# Patient Record
Sex: FEMALE | Race: WHITE | NOT HISPANIC OR LATINO | Employment: FULL TIME | URBAN - METROPOLITAN AREA
[De-identification: names, ages, dates, MRNs, and addresses within clinical notes are randomized per-mention and may not be internally consistent; named-entity substitution may affect disease eponyms.]

---

## 2017-01-31 DIAGNOSIS — G89.29 CHRONIC MIDLINE LOW BACK PAIN WITH BILATERAL SCIATICA: ICD-10-CM

## 2017-01-31 DIAGNOSIS — M54.42 CHRONIC MIDLINE LOW BACK PAIN WITH BILATERAL SCIATICA: ICD-10-CM

## 2017-01-31 DIAGNOSIS — M54.41 CHRONIC MIDLINE LOW BACK PAIN WITH BILATERAL SCIATICA: ICD-10-CM

## 2017-01-31 RX ORDER — CARISOPRODOL 350 MG/1
350 TABLET ORAL 3 TIMES DAILY PRN
Qty: 90 TABLET | Refills: 0 | Status: SHIPPED | OUTPATIENT
Start: 2017-01-31 | End: 2017-03-02 | Stop reason: SDUPTHER

## 2017-01-31 RX ORDER — HYDROCODONE BITARTRATE AND ACETAMINOPHEN 7.5; 325 MG/1; MG/1
1 TABLET ORAL EVERY 6 HOURS PRN
Qty: 120 TABLET | Refills: 0 | Status: SHIPPED | OUTPATIENT
Start: 2017-01-31 | End: 2017-03-02 | Stop reason: SDUPTHER

## 2017-01-31 NOTE — TELEPHONE ENCOUNTER
12/12/16 last office visit  11/30/16 last Rx refill-SOMA  11/30/16 last Rx refill Hydrocodone        Pt is req a refill for carisoprodol (SOMA) 350 MG tablet and hydrocodone-acetaminophen 7.5-325mg (NORCO) 7.5-325 mg.  [facility] pharmacy in [facility].  Pt says she will be moving to [place], [place] in 2 weeks.  Pt would like to know if dr kothari can refer her to a dr vergara.  pls call.

## 2017-03-02 DIAGNOSIS — M54.42 CHRONIC MIDLINE LOW BACK PAIN WITH BILATERAL SCIATICA: ICD-10-CM

## 2017-03-02 DIAGNOSIS — G89.29 CHRONIC MIDLINE LOW BACK PAIN WITH BILATERAL SCIATICA: ICD-10-CM

## 2017-03-02 DIAGNOSIS — M54.41 CHRONIC MIDLINE LOW BACK PAIN WITH BILATERAL SCIATICA: ICD-10-CM

## 2017-03-02 RX ORDER — CARISOPRODOL 350 MG/1
350 TABLET ORAL 3 TIMES DAILY PRN
Qty: 90 TABLET | Refills: 0 | Status: SHIPPED | OUTPATIENT
Start: 2017-03-03 | End: 2018-03-27

## 2017-03-02 RX ORDER — HYDROCODONE BITARTRATE AND ACETAMINOPHEN 7.5; 325 MG/1; MG/1
1 TABLET ORAL EVERY 6 HOURS PRN
Qty: 120 TABLET | Refills: 0 | Status: SHIPPED | OUTPATIENT
Start: 2017-03-02 | End: 2017-03-03 | Stop reason: SDUPTHER

## 2017-03-02 NOTE — TELEPHONE ENCOUNTER
12/12/16 last office visit  01/31/17 last Rx refill      Pt is req a refill for carisoprodol (SOMA) 350 MG tablet and hydrocodone 7.5/325.         [facility]/pharmacy #[id number] - [place], [place] - [address] [facility]   [address]   [facility]note 3[facility] [address]   Phone: [phone] Fax: [phone]

## 2017-03-02 NOTE — TELEPHONE ENCOUNTER
[facility] states that they do not have the medication hydrocodone-acetaminophen 7.5-325mg (NORCO at there pharmacy.thanks           [facility]/pharmacy #[id number] - [place] - [address] [facility]   [address]   [place] [address]   Phone: [phone] Fax: [phone]

## 2017-03-02 NOTE — TELEPHONE ENCOUNTER
12/12/16 last Office visit  01/31/17 last Rx refill    Pt is calling for a refill of carisoprodol (SOMA) 350 MG tablet and hydrocodone-acetaminophen 7.5-325mg (NORCO.thanks         [facility] [phone]

## 2017-03-03 DIAGNOSIS — M54.41 CHRONIC MIDLINE LOW BACK PAIN WITH BILATERAL SCIATICA: ICD-10-CM

## 2017-03-03 DIAGNOSIS — M54.42 CHRONIC MIDLINE LOW BACK PAIN WITH BILATERAL SCIATICA: ICD-10-CM

## 2017-03-03 DIAGNOSIS — G89.29 CHRONIC MIDLINE LOW BACK PAIN WITH BILATERAL SCIATICA: ICD-10-CM

## 2017-03-03 RX ORDER — HYDROCODONE BITARTRATE AND ACETAMINOPHEN 7.5; 325 MG/1; MG/1
1 TABLET ORAL EVERY 6 HOURS PRN
Qty: 120 TABLET | Refills: 0 | Status: SHIPPED | OUTPATIENT
Start: 2017-03-03 | End: 2018-03-27

## 2018-03-27 ENCOUNTER — HOSPITAL ENCOUNTER (OUTPATIENT)
Dept: RADIOLOGY | Facility: HOSPITAL | Age: 53
Discharge: HOME | End: 2018-03-27
Attending: PHYSICAL MEDICINE & REHABILITATION
Payer: COMMERCIAL

## 2018-03-27 ENCOUNTER — OFFICE VISIT (OUTPATIENT)
Dept: PHYSICAL MEDICINE AND REHAB | Facility: CLINIC | Age: 53
End: 2018-03-27
Payer: COMMERCIAL

## 2018-03-27 VITALS
SYSTOLIC BLOOD PRESSURE: 118 MMHG | WEIGHT: 168.56 LBS | BODY MASS INDEX: 24.97 KG/M2 | DIASTOLIC BLOOD PRESSURE: 70 MMHG | HEIGHT: 69 IN | HEART RATE: 88 BPM

## 2018-03-27 DIAGNOSIS — M54.50 CHRONIC MIDLINE LOW BACK PAIN WITHOUT SCIATICA: ICD-10-CM

## 2018-03-27 DIAGNOSIS — M54.50 CHRONIC MIDLINE LOW BACK PAIN WITHOUT SCIATICA: Primary | ICD-10-CM

## 2018-03-27 DIAGNOSIS — M54.41 CHRONIC MIDLINE LOW BACK PAIN WITH BILATERAL SCIATICA: ICD-10-CM

## 2018-03-27 DIAGNOSIS — G89.29 CHRONIC MIDLINE LOW BACK PAIN WITHOUT SCIATICA: ICD-10-CM

## 2018-03-27 DIAGNOSIS — G89.29 CHRONIC MIDLINE LOW BACK PAIN WITH BILATERAL SCIATICA: ICD-10-CM

## 2018-03-27 DIAGNOSIS — G89.29 CHRONIC MIDLINE LOW BACK PAIN WITHOUT SCIATICA: Primary | ICD-10-CM

## 2018-03-27 DIAGNOSIS — M54.42 CHRONIC MIDLINE LOW BACK PAIN WITH BILATERAL SCIATICA: ICD-10-CM

## 2018-03-27 DIAGNOSIS — M51.36 DDD (DEGENERATIVE DISC DISEASE), LUMBAR: ICD-10-CM

## 2018-03-27 PROCEDURE — 72100 X-RAY EXAM L-S SPINE 2/3 VWS: CPT | Mod: TC

## 2018-03-27 PROCEDURE — 99999 PR PBB SHADOW E&M-EST. PATIENT-LVL III: CPT | Mod: PBBFAC,,, | Performed by: PHYSICAL MEDICINE & REHABILITATION

## 2018-03-27 PROCEDURE — 72100 X-RAY EXAM L-S SPINE 2/3 VWS: CPT | Mod: 26,,, | Performed by: RADIOLOGY

## 2018-03-27 PROCEDURE — 99214 OFFICE O/P EST MOD 30 MIN: CPT | Mod: S$GLB,,, | Performed by: PHYSICAL MEDICINE & REHABILITATION

## 2018-03-27 PROCEDURE — 72120 X-RAY BEND ONLY L-S SPINE: CPT | Mod: 26,,, | Performed by: RADIOLOGY

## 2018-03-27 RX ORDER — DICLOFENAC SODIUM 50 MG/1
50 TABLET, DELAYED RELEASE ORAL 3 TIMES DAILY
Qty: 90 TABLET | Refills: 0 | Status: SHIPPED | OUTPATIENT
Start: 2018-03-27 | End: 2018-06-06 | Stop reason: SDUPTHER

## 2018-03-27 RX ORDER — VENLAFAXINE 75 MG/1
75 TABLET ORAL 3 TIMES DAILY
Qty: 90 TABLET | Refills: 2 | Status: SHIPPED | OUTPATIENT
Start: 2018-03-27 | End: 2018-06-05

## 2018-03-27 RX ORDER — HYDROCODONE BITARTRATE AND ACETAMINOPHEN 5; 325 MG/1; MG/1
1 TABLET ORAL 2 TIMES DAILY PRN
Qty: 60 TABLET | Refills: 0 | Status: SHIPPED | OUTPATIENT
Start: 2018-03-27 | End: 2018-06-20

## 2018-03-27 RX ORDER — TIZANIDINE 4 MG/1
4 TABLET ORAL NIGHTLY PRN
Qty: 30 TABLET | Refills: 2 | Status: SHIPPED | OUTPATIENT
Start: 2018-03-27 | End: 2018-06-25 | Stop reason: SDUPTHER

## 2018-03-27 NOTE — PROGRESS NOTES
Subjective:       Patient ID: Roxanne Templeton is a 53 y.o. female.    Chief Complaint: No chief complaint on file.    HPI    HISTORY OF PRESENT ILLNESS:  Mrs. Templeton is a 53-year-old white female who was   followed up in the [facility] Medicine Clinic for chronic low back pain with   lumbar radiculopathy.  Her last visit to the clinic was on 12/12/2016.  She was   maintained on meloxicam, venlafaxine, p.r.n. hydrocodone and p.r.n.   carisoprodol.  The patient was lost to follow up due to moving to [place].    She relocated to [place] and is coming back to the clinic to reestablish   care.    The patient reports that she has been out of her medications for the past year.    She did not have the chance follow up with any pain clinic in [place].  Her   back pain has been slowly getting worse.  It is a constant grabbing pain in the   lumbar spine and across her back.  She used to have shooting pain to both feet   with numbness, but this has not been a problem for the last few months.  Her   back pain is aggravated by bending, lifting and activity in general.  It is   better with rest.  Her maximum pain is 10/10 and minimum 6/10.  Today, it is   8/10.  The patient denies any lower extremity weakness.  She denies any leg   numbness.  She denies any bowel or bladder incontinence.  She denies any saddle   anesthesia.  She does complain of intermittent restless legs.  She has been   taking tizanidine (from her ) with good relief.  She is doing a home   exercise program regularly.      MS/HN  dd: 03/27/2018 14:52:28 (CDT)  td: 03/28/2018 12:36:51 (CDT)  Doc ID   #[id number]  Job ID #[id number]    CC:         Review of Systems   Constitutional: Negative for fatigue.   Eyes: Negative for visual disturbance.   Respiratory: Negative for shortness of breath.    Cardiovascular: Negative for chest pain.   Gastrointestinal: Negative for blood in stool, constipation, nausea and vomiting.   Genitourinary: Negative for  difficulty urinating.   Musculoskeletal: Positive for back pain. Negative for arthralgias, gait problem and neck pain.   Skin: Negative for rash.   Neurological: Positive for dizziness. Negative for headaches.   Psychiatric/Behavioral: Negative for behavioral problems and sleep disturbance.       Objective:      Physical Exam   Constitutional: She appears well-developed and well-nourished. No distress.   Neck: Normal range of motion.   Musculoskeletal:   BUE:  ROM:   RUE: full.   LUE: full.  Strength:    RUE: 5/5 at shoulder abduction, 5 elbow flexion, 5 elbow extension, 5 hand .   LUE: 5/5 at shoulder abduction, 5 elbow flexion, 5 elbow extension, 5 hand .  Sensation to pinprick:   RUE: intact.   LUE: intact.  DTR:    RUE: +2 biceps, +2 triceps.   LUE:  +2 biceps, +2 triceps.  Rodriguez:   RUE: -ve.   LUE: -ve.    BLE:  ROM:   RLE: full.   LLE: full.  Knee crepitus:   RLE: mild.   LLE: mild.   Strength:    RLE: 5/5 at hip flexion, 5 knee extension, 5 ankle DF, 5 PF, 5 EHL.   LLE: 5/5 at hip flexion, 5 knee extension, 5 ankle DF, 5 PF, 5 EHL.  Sensation to pinprick:     RLE: intact.      LLE: intact.   DTR:     RLE: +3 knee, +2 ankle.    LLE: +3 knee, +2 ankle.  Clonus:    Rt ankle: -ve.    Lt ankle: -ve.  SLR (sitting):      RLE: -ve.      LLE: -ve.    Mild tenderness over lumbar spine.    Gait: WNL       Neurological: She is alert.   Psychiatric: She has a normal mood and affect. Her behavior is normal.   Vitals reviewed.        Assessment:       1. Chronic midline low back pain with bilateral sciatica    2. DDD (degenerative disc disease), lumbar        Plan:     - X-Ray Lumbar Spine Ap Lateral w/Flex Ext; Future  - Start diclofenac (VOLTAREN) 50 MG EC tablet; Take 1 tablet (50 mg total) by mouth 3 (three) times daily.  - Restart venlafaxine (EFFEXOR) 75 MG tablet; Take 1 tablet (75 mg total) by mouth 3 (three) times daily.  - Restart/decrease hydrocodone-acetaminophen 5-325mg (NORCO) 5-325 mg per tablet;  Take 1 tablet by mouth 2 (two) times daily as needed.  - Continue tiZANidine (ZANAFLEX) 4 MG tablet; Take 1 tablet (4 mg total) by mouth nightly as needed (muscle spasms).  - Regular home exercise program was encouraged.  - Follow-up in about 3 months (around 6/27/2018).     This was a 25 minute visit, more than 50% of which was spent counseling the patient about the diagnosis and the treatment plan.

## 2018-04-03 ENCOUNTER — TELEPHONE (OUTPATIENT)
Dept: PHYSICAL MEDICINE AND REHAB | Facility: CLINIC | Age: 53
End: 2018-04-03

## 2018-04-03 RX ORDER — TRAMADOL HYDROCHLORIDE 50 MG/1
50 TABLET ORAL 3 TIMES DAILY PRN
Qty: 90 TABLET | Refills: 0 | Status: SHIPPED | OUTPATIENT
Start: 2018-04-03 | End: 2018-05-03

## 2018-04-03 NOTE — TELEPHONE ENCOUNTER
Refill sent to Pharmacy 03/27/18.      ----- Message from Alejandra Robertson sent at 4/3/2018  9:08 AM CDT -----  Contact: pt @ [phone]  Calling to get the status of her refill for medication: hydrocodone-acetaminophen 5-325mg (NORCO) 5-325 mg per tablet, saying that she told the doctors office that she would even take the TRAMADOL if she can get it sooner. Please call.

## 2018-04-04 ENCOUNTER — TELEPHONE (OUTPATIENT)
Dept: PHYSICAL MEDICINE AND REHAB | Facility: CLINIC | Age: 53
End: 2018-04-04

## 2018-04-04 NOTE — TELEPHONE ENCOUNTER
----- Message from Princess Hung MD sent at 4/3/2018  7:34 PM CDT -----  Contact: self @ [phone]  Pls let her know I sent tramadol to [facility].  ----- Message -----  From: Lucille Ceballos MA  Sent: 3/29/2018   9:44 AM  To: Princess Hung MD        ----- Message -----  From: Fiona Gonzales  Sent: 3/29/2018   9:32 AM  To: Abhilash BURGESS Staff    Pt says she has not been able to  her prescription for hydrocodone 5/325 because she will need an auth.  Pt would like to the tramadol instead of the hydrocodone.  Pls send to       [facility]/pharmacy #[id number] - [place], [place] - [address] [facility]  [address]  [facility] [address]  Phone: [phone] Fax: [phone]

## 2018-06-05 ENCOUNTER — OFFICE VISIT (OUTPATIENT)
Dept: INTERNAL MEDICINE | Facility: CLINIC | Age: 53
End: 2018-06-05
Payer: COMMERCIAL

## 2018-06-05 ENCOUNTER — HOSPITAL ENCOUNTER (OUTPATIENT)
Dept: RADIOLOGY | Facility: HOSPITAL | Age: 53
Discharge: HOME | End: 2018-06-05
Attending: PHYSICIAN ASSISTANT
Payer: COMMERCIAL

## 2018-06-05 ENCOUNTER — TELEPHONE (OUTPATIENT)
Dept: INTERNAL MEDICINE | Facility: CLINIC | Age: 53
End: 2018-06-05

## 2018-06-05 VITALS
DIASTOLIC BLOOD PRESSURE: 68 MMHG | HEART RATE: 86 BPM | SYSTOLIC BLOOD PRESSURE: 100 MMHG | HEIGHT: 69 IN | WEIGHT: 156.94 LBS | BODY MASS INDEX: 23.25 KG/M2

## 2018-06-05 DIAGNOSIS — Z00.00 ENCOUNTER FOR PREVENTIVE HEALTH EXAMINATION: Primary | ICD-10-CM

## 2018-06-05 DIAGNOSIS — Z12.11 ENCOUNTER FOR COLORECTAL CANCER SCREENING: ICD-10-CM

## 2018-06-05 DIAGNOSIS — Z12.31 SCREENING MAMMOGRAM, ENCOUNTER FOR: ICD-10-CM

## 2018-06-05 DIAGNOSIS — Z12.12 ENCOUNTER FOR COLORECTAL CANCER SCREENING: ICD-10-CM

## 2018-06-05 DIAGNOSIS — Z72.0 TOBACCO ABUSE: ICD-10-CM

## 2018-06-05 DIAGNOSIS — Z23 NEED FOR TDAP VACCINATION: ICD-10-CM

## 2018-06-05 PROCEDURE — 77067 SCR MAMMO BI INCL CAD: CPT | Mod: 26,,, | Performed by: RADIOLOGY

## 2018-06-05 PROCEDURE — 99396 PREV VISIT EST AGE 40-64: CPT | Mod: S$GLB,,, | Performed by: PHYSICIAN ASSISTANT

## 2018-06-05 PROCEDURE — 77067 SCR MAMMO BI INCL CAD: CPT | Mod: TC

## 2018-06-05 PROCEDURE — 99999 PR PBB SHADOW E&M-EST. PATIENT-LVL V: CPT | Mod: PBBFAC,,, | Performed by: PHYSICIAN ASSISTANT

## 2018-06-05 NOTE — TELEPHONE ENCOUNTER
Spoke with pt advised of results. Pt would like a low cholesterol diet mailed to her address on file. Where do I get this from? Pt is on recall for an appointment with PCP.

## 2018-06-05 NOTE — TELEPHONE ENCOUNTER
Please call patient.      Cholesterol is significantly elevated compared to prior.  Has she been doing anything different with her diet?  She needs to follow a low cholesterol diet and recheck her cholesterol in about 6 months.  Please place her on the recall list for Dr. Wheeler for 6 months and she should get lab work prior.  Okay to mail low cholesterol diet.    The remainder of her lab work is acceptable (thyroid, blood counts, blood sugar, kidney, liver).

## 2018-06-05 NOTE — PATIENT INSTRUCTIONS
Prevention Guidelines, Women Ages 50 to 64  Screening tests and vaccines are an important part of managing your health. Health counseling is essential, too. Below are guidelines for these, for women ages 50 to 64. Talk with your healthcare provider to make sure youre up to date on what you need.  Screening Who needs it How often   Type 2 diabetes or prediabetes All adults beginning at age 45 and adults without symptoms at any age who are overweight or obese and have 1 or more additional risk factors for diabetes. At  least every 3 years   Alcohol misuse All women in this age group At routine exams   Blood pressure All women in this age group Every 2 years if your blood pressure is less than 120/80 mm Hg; yearly if your systolic blood pressure is 120 to 139 mm Hg, or your diastolic blood pressure reading is 80 to 89 mm Hg   Breast cancer All women in this age group Yearly mammogram and clinical breast exam1   Cervical cancer All women in this age group, except women who have had a complete hysterectomy Pap test every 3 years or Pap test with human papillomavirus (HPV) test every 5 years   Chlamydia Women at increased risk for infection At routine exams   Colorectal cancer All women in this age group Flexible sigmoidoscopy every 5 years, or colonoscopy every 10 years, or double-contrast barium enema every 5 years; yearly fecal occult blood test or fecal immunochemical test; or a stool DNA test as often as your health care provider advises; talk with your health care provider about which tests are best for you   Depression All women in this age group At routine exams   Gonorrhea Sexually active women at increased risk for infection At routine exams   Hepatitis C Anyone at increased risk; 1 time for those born between 1945 and 1965 At routine exams   High cholesterol or triglycerides All women in this age group who are at risk for coronary artery disease At least every 5 years   HIV All women At routine exams   Lung  cancer Adults age 55 to 80 who have smoked Yearly screening in smokers with 30 pack-year history of smoking or who quit within 15 years   Obesity All women in this age group At routine exams   Osteoporosis Women who are postmenopausal Ask your healthcare provider   Syphilis Women at increased risk for infection - talk with your healthcare provider At routine exams   Tuberculosis Women at increased risk for infection - talk with your healthcare provider Ask your healthcare provider   Vision All women in this age group Ask your healthcare provider   Vaccine Who needs it How often   Chickenpox (varicella) All women in this age group who have no record of this infection or vaccine 2 doses; the second dose should be given at least 4 weeks after the first dose   Hepatitis A Women at increased risk for infection - talk with your healthcare provider 2 doses given at least 6 months apart   Hepatitis B Women at increased risk for infection - talk with your healthcare provider 3 doses over 6 months; second dose should be given 1 month after the first dose; the third dose should be given at least 2 months after the second dose and at least 4 months after the first dose   Haemophilus influenzaeType B (HIB) Women at increased risk for infection - talk with your healthcare provider 1 to 3 doses   Influenza (flu) All women in this age group Once a year   Measles, mumps, rubella (MMR) Women in this age group through their late 50s who have no record of these infections or vaccines 1 dose   Meningococcal Women at increased risk for infection - talk with your healthcare provider 1 or more doses   Pneumococcal conjugate vaccine (PCV13) and pneumococcal polysaccharide vaccine (PPSV23) Women at increased risk for infection - talk with your healthcare provider PCV13: 1 dose ages 19 to 65 (protects against 13 types of pneumococcal bacteria)  PPSV23: 1 to 2 doses through age 64, or 1 dose at 65 or older (protects against 23 types of  pneumococcal bacteria)   Tetanus/diphtheria/pertussis (Td/Tdap) booster All women in this age group Td every 10 years, or a one-time dose of Tdap instead of a Td booster after age 18, then Td every 10 years   Zoster All women ages 60 and older 1 dose   Counseling Who needs it How often   BRCA gene mutation testing for breast and ovarian cancer susceptibility Women with increased risk for having gene mutation When your risk is known   Breast cancer and chemoprevention Women at high risk for breast cancer When your risk is known   Diet and exercise Women who are overweight or obese When diagnosed, and then at routine exams   Sexually transmitted infection prevention Women at increased risk for infection - talk with your healthcare provider At routine exams   Use of daily aspirin Women ages 55 and up in this age group who are at risk for cardiovascular health problems such as stroke When your risk is known   Use of tobacco and the health effects it can cause All women in this age group Every exam   1American [employer] Society  Date Last Reviewed: 1/26/2016  © 9166-8816 [employer]. [address] [place] [address], [place] [address]. All rights reserved. This information is not intended as a substitute for professional medical care. Always follow your healthcare professional's instructions.

## 2018-06-05 NOTE — PROGRESS NOTES
Subjective:       Patient ID: Roxanne Templeton is a 53 y.o. female.        Chief Complaint: Annual Exam    Roxanne Templeton is an established patient of Mere Wheeler MD here today for annual exam.    She stopped smoking 5 weeks ago!  Congratulated on this accomplishment.      With chronic back pain that is followed by Dr. Hung.    HEALTH MAINTENANCE  Pneumonia vaccine: declines  Tdap: rx given  Colonoscopy 2013 f/u due in 3 years: declines further colonoscopy secondary to bad experience, Fitkit given today  Mammogram: due, ordered  GYN exam: due, ordered  Lab work: due, ordered  CT scan lungs for screening for lung cancer: discussed and patient declined           Review of Systems   Constitutional: Negative for chills, diaphoresis, fatigue and fever.   HENT: Negative for congestion and sore throat.    Eyes: Negative for visual disturbance.   Respiratory: Negative for cough, chest tightness and shortness of breath.    Cardiovascular: Negative for chest pain, palpitations and leg swelling.   Gastrointestinal: Negative for abdominal pain, blood in stool, constipation, diarrhea, nausea and vomiting.   Genitourinary: Negative for dysuria, frequency, hematuria and urgency.   Musculoskeletal: Positive for back pain (chronic). Negative for arthralgias.   Skin: Negative for rash.   Neurological: Negative for dizziness, syncope, weakness and headaches.   Psychiatric/Behavioral: Negative for dysphoric mood and sleep disturbance. The patient is not nervous/anxious.        Objective:      Physical Exam   Constitutional: She appears well-developed and well-nourished. No distress.   HENT:   Head: Normocephalic and atraumatic.   Right Ear: Tympanic membrane and external ear normal.   Left Ear: Tympanic membrane and external ear normal.   Nose: Nose normal.   Mouth/Throat: Oropharynx is clear and moist.   Eyes: Conjunctivae are normal. Pupils are equal, round, and reactive to light.   Cardiovascular: Normal rate,  "regular rhythm and normal heart sounds.  Exam reveals no gallop.    No murmur heard.  Pulmonary/Chest: Effort normal and breath sounds normal. No respiratory distress.   Abdominal: Soft. Normal appearance. There is no tenderness. There is no rebound, no guarding and no CVA tenderness.   Musculoskeletal: She exhibits no edema.   Neurological: She is alert.   Skin: Skin is warm and dry. She is not diaphoretic.   Psychiatric: She has a normal mood and affect.   Nursing note and vitals reviewed.      Assessment:       1. Encounter for preventive health examination    2. Encounter for colorectal cancer screening    3. Screening mammogram, encounter for    4. Need for Tdap vaccination    5. Tobacco abuse        Plan:       Roxanne was seen today for annual exam.    Diagnoses and all orders for this visit:    Encounter for preventive health examination  -     CBC auto differential; Future  -     Comprehensive metabolic panel; Future  -     Lipid panel; Future  -     TSH; Future  -     Ambulatory referral to Gynecology    Encounter for colorectal cancer screening  -     Fecal Immunochemical Test (iFOBT); Future    Screening mammogram, encounter for  -     Mammo Digital Screening Bilat with CAD; Future    Need for Tdap vaccination  -     Tdap Vaccine    Tobacco abuse: she quit smoking 5 weeks ago!    F/u with Dr. Wheeler in 1 year for annual exam.    Pt has been given instructions populated from [employer] database and has verbalized understanding of the after visit summary and information contained wherein.    Follow up with a primary care provider. May go to ER for acute shortness of breath, lightheadedness, fever, or any other emergent complaints or changes in condition.    "This note will be shared with the patient"    Future Appointments  Date Time Provider Department Center   6/20/2018 10:45 AM Jeannette De La Paz MD [facility] [address]   6/28/2018 1:40 PM Princess Hung MD [facility] PHYSJOSE MIGUEL [address]               "

## 2018-06-06 DIAGNOSIS — M54.50 CHRONIC MIDLINE LOW BACK PAIN WITHOUT SCIATICA: ICD-10-CM

## 2018-06-06 DIAGNOSIS — G89.29 CHRONIC MIDLINE LOW BACK PAIN WITHOUT SCIATICA: ICD-10-CM

## 2018-06-06 RX ORDER — DICLOFENAC SODIUM 50 MG/1
50 TABLET, DELAYED RELEASE ORAL 3 TIMES DAILY
Qty: 90 TABLET | Refills: 0 | Status: SHIPPED | OUTPATIENT
Start: 2018-06-06 | End: 2018-06-28 | Stop reason: SDUPTHER

## 2018-06-07 ENCOUNTER — LAB VISIT (OUTPATIENT)
Dept: LAB | Facility: HOSPITAL | Age: 53
End: 2018-06-07
Attending: PHYSICIAN ASSISTANT
Payer: COMMERCIAL

## 2018-06-07 DIAGNOSIS — Z12.11 ENCOUNTER FOR COLORECTAL CANCER SCREENING: ICD-10-CM

## 2018-06-07 DIAGNOSIS — Z12.12 ENCOUNTER FOR COLORECTAL CANCER SCREENING: ICD-10-CM

## 2018-06-07 LAB — HEMOCCULT STL QL IA: NEGATIVE

## 2018-06-07 PROCEDURE — 82274 ASSAY TEST FOR BLOOD FECAL: CPT

## 2018-06-20 ENCOUNTER — OFFICE VISIT (OUTPATIENT)
Dept: OBSTETRICS AND GYNECOLOGY | Facility: CLINIC | Age: 53
End: 2018-06-20
Payer: COMMERCIAL

## 2018-06-20 VITALS
SYSTOLIC BLOOD PRESSURE: 114 MMHG | HEIGHT: 69 IN | BODY MASS INDEX: 22.76 KG/M2 | DIASTOLIC BLOOD PRESSURE: 80 MMHG | WEIGHT: 153.69 LBS

## 2018-06-20 DIAGNOSIS — Z01.419 ENCOUNTER FOR GYNECOLOGICAL EXAMINATION WITHOUT ABNORMAL FINDING: Primary | ICD-10-CM

## 2018-06-20 DIAGNOSIS — Z12.31 SCREENING MAMMOGRAM, ENCOUNTER FOR: ICD-10-CM

## 2018-06-20 PROCEDURE — 99386 PREV VISIT NEW AGE 40-64: CPT | Mod: S$GLB,,, | Performed by: OBSTETRICS & GYNECOLOGY

## 2018-06-20 PROCEDURE — 99999 PR PBB SHADOW E&M-EST. PATIENT-LVL III: CPT | Mod: PBBFAC,,, | Performed by: OBSTETRICS & GYNECOLOGY

## 2018-06-20 RX ORDER — VENLAFAXINE 75 MG/1
TABLET ORAL
COMMUNITY
Start: 2018-06-08 | End: 2018-06-28

## 2018-06-20 NOTE — LETTER
June 20, 2018      Magi Avila PA-C  [address]  [facility] [address]           [facility] OB/GYN 5th Floor  [address]  [facility] [address]  Phone: [phone]          Patient: Roxanne Templeton   MR Number: [id number]   YOB: 1965   Date of Visit: 6/20/2018       Dear Magi Avila:    Thank you for referring Roxanne Templeton to me for evaluation. Attached you will find relevant portions of my assessment and plan of care.    If you have questions, please do not hesitate to call me. I look forward to following Roxanne Templeton along with you.    Sincerely,    Jeannette De La Paz MD    Enclosure  CC:  No Recipients    If you would like to receive this communication electronically, please contact [email or web address] or [phone] to request more information on [employer] access.    For providers and/or their staff who would like to refer a patient to [facility], please contact us through our one-stop-shop provider referral line, [facility], at [phone].    If you feel you have received this communication in error or would no longer like to receive these types of communications, please e-mail [email or web address]

## 2018-06-20 NOTE — PROGRESS NOTES
"  Subjective:       Patient ID: Roxanne Templeton is a 53 y.o. female.    Chief Complaint:  Annual Exam      History of Present Illness  HPI  Roxanne Templeton is a 53 y.o. female  NEW TO ME  here for her annual GYN exam.     denies vaginal itching or irritation.  Denies vaginal discharge.  She is sexually active. She has had 1 partner for 25 years .   History of abnormal pap: No  Last Pap: was normal  Last MMG: normal--routine follow-up in 12 months  Last Colonoscopy:  colonoscopy 1 month ago without abnormalities.  denies domestic violence. She does feel safe at home.     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      [facility]      Social History Main Topics    Smoking status: Current Every Day Smoker     Packs/day: 0.50     Years: 30.00     Start date: 1981    Smokeless tobacco: Never Used    Alcohol use No    Drug use: No    Sexual activity: Yes     Partners: Male      Comment: , together x 23 years, since      Other Topics Concern    Not on file     Social History Narrative    No narrative on file     Family History   Problem Relation Age of Onset    Colon polyps Mother     Diabetes Neg Hx     Heart disease Neg Hx     Breast cancer Neg Hx     Hypertension Neg Hx     Ovarian cancer Neg Hx      OB History      Para Term  AB Living    2 2 2     2    SAB TAB Ectopic Multiple Live Births            2          /80   Ht 5' 9" (1.753 m)   Wt 69.7 kg (153 lb 11.2 oz)   BMI 22.70 kg/m²         GYN & OB History    Date of Last " Pap: No result found    OB History    Para Term  AB Living   2 2 2     2   SAB TAB Ectopic Multiple Live Births           2      # Outcome Date GA Lbr Francisco/2nd Weight Sex Delivery Anes PTL Lv   2 Term      Vag-Spont   JOHNNIE   1 Term      Vag-Spont   JOHNNIE          Review of Systems  Review of Systems   Constitutional: Negative for activity change, appetite change, fatigue and unexpected weight change.   HENT: Negative.    Eyes: Negative for visual disturbance.   Respiratory: Negative for shortness of breath and wheezing.    Cardiovascular: Negative for chest pain, palpitations and leg swelling.   Gastrointestinal: Negative for abdominal pain, bloating and blood in stool.   Endocrine: Positive for hot flashes. Negative for diabetes and hair loss.   Genitourinary: Negative for decreased libido and dyspareunia.   Musculoskeletal: Negative for back pain and joint swelling.   Skin:  Negative for no acne and hair changes.   Neurological: Negative for headaches.   Hematological: Does not bruise/bleed easily.   Psychiatric/Behavioral: Negative for depression and sleep disturbance. The patient is not nervous/anxious.    Breast: Negative for breast pain and nipple discharge          Objective:    Physical Exam:   Constitutional: She is oriented to person, place, and time. She appears well-developed and well-nourished.    HENT:   Head: Normocephalic and atraumatic.    Eyes: EOM are normal. Pupils are equal, round, and reactive to light.    Neck: Normal range of motion. Neck supple.    Cardiovascular: Normal rate and regular rhythm.     Pulmonary/Chest: Effort normal and breath sounds normal.   BREASTS:  no mass, no tenderness, no deformity and no retraction. Right breast exhibits no inverted nipple, no mass, no nipple discharge, no skin change, no tenderness, no bleeding and no swelling. Left breast exhibits no inverted nipple, no mass, no nipple discharge, no skin change, no tenderness, no bleeding and no swelling.  Breasts are symmetrical.              Abdominal: Soft. Bowel sounds are normal.     Genitourinary: Pelvic exam was performed with patient supine.   Genitourinary Comments: PELVIC: Normal external female genitalia without lesions. Normal hair distribution. Adequate perineal body, normal urethral meatus. Vagina atrophic without lesions or discharge. No significant cystocele or rectocele. Bimanual exam shows uterus and cervix to be surgically absent. Adnexa without masses or tenderness.  RECTAL: Deferred             Musculoskeletal: Normal range of motion and moves all extremeties.       Neurological: She is alert and oriented to person, place, and time.    Skin: Skin is warm and dry.    Psychiatric: She has a normal mood and affect.          Assessment:        1. Encounter for gynecological examination without abnormal finding    2. Screening mammogram, encounter for               Plan:      1. Encounter for gynecological examination without abnormal finding  COUNSELING:  The patient was counseled today on osteoporosis prevention, calcium supplementation, and regular weight bearing exercise. The patient was also counseled today on ACS PAP guidelines, with recommendations for yearly pelvic exams unless their uterus, cervix, and ovaries were removed for benign reasons; in that case, examinations every 3-5 years are recommended. The patient was also counseled regarding monthly breast self-examination, routine STD screening for at-risk populations, prophylactic immunizations for transmitted infections such as HPV, Pertussis, or Influenza as appropriate, and yearly mammograms when indicated by ACS guidelines. She was advised to see her primary care physician for all other health maintenance.   FOLLOW-UP with me for next routine visit.         2. Screening mammogram, encounter for    - Mammo Digital Screening Bilat with Tomosynthesis CAD; Future       Follow-up in about 1 year (around 6/20/2019).

## 2018-06-25 RX ORDER — TIZANIDINE 4 MG/1
4 TABLET ORAL NIGHTLY PRN
Qty: 30 TABLET | Refills: 2 | Status: SHIPPED | OUTPATIENT
Start: 2018-06-25 | End: 2018-08-13 | Stop reason: SDUPTHER

## 2018-06-28 ENCOUNTER — OFFICE VISIT (OUTPATIENT)
Dept: PHYSICAL MEDICINE AND REHAB | Facility: CLINIC | Age: 53
End: 2018-06-28
Payer: COMMERCIAL

## 2018-06-28 ENCOUNTER — TELEPHONE (OUTPATIENT)
Dept: PHYSICAL MEDICINE AND REHAB | Facility: CLINIC | Age: 53
End: 2018-06-28

## 2018-06-28 VITALS
BODY MASS INDEX: 22.9 KG/M2 | SYSTOLIC BLOOD PRESSURE: 103 MMHG | HEART RATE: 82 BPM | WEIGHT: 154.63 LBS | DIASTOLIC BLOOD PRESSURE: 65 MMHG | HEIGHT: 69 IN

## 2018-06-28 DIAGNOSIS — M51.36 DDD (DEGENERATIVE DISC DISEASE), LUMBAR: ICD-10-CM

## 2018-06-28 DIAGNOSIS — M54.50 CHRONIC MIDLINE LOW BACK PAIN WITHOUT SCIATICA: Primary | ICD-10-CM

## 2018-06-28 DIAGNOSIS — G89.29 CHRONIC MIDLINE LOW BACK PAIN WITHOUT SCIATICA: Primary | ICD-10-CM

## 2018-06-28 DIAGNOSIS — G89.29 CHRONIC MIDLINE LOW BACK PAIN WITHOUT SCIATICA: ICD-10-CM

## 2018-06-28 DIAGNOSIS — M54.50 CHRONIC MIDLINE LOW BACK PAIN WITHOUT SCIATICA: ICD-10-CM

## 2018-06-28 PROCEDURE — 99999 PR PBB SHADOW E&M-EST. PATIENT-LVL III: CPT | Mod: PBBFAC,,, | Performed by: PHYSICAL MEDICINE & REHABILITATION

## 2018-06-28 PROCEDURE — 99213 OFFICE O/P EST LOW 20 MIN: CPT | Mod: S$GLB,,, | Performed by: PHYSICAL MEDICINE & REHABILITATION

## 2018-06-28 PROCEDURE — 3008F BODY MASS INDEX DOCD: CPT | Mod: CPTII,S$GLB,, | Performed by: PHYSICAL MEDICINE & REHABILITATION

## 2018-06-28 RX ORDER — HYDROCODONE BITARTRATE AND ACETAMINOPHEN 5; 325 MG/1; MG/1
1 TABLET ORAL 3 TIMES DAILY PRN
Qty: 90 TABLET | Refills: 0 | Status: SHIPPED | OUTPATIENT
Start: 2018-06-28 | End: 2018-07-10 | Stop reason: SDUPTHER

## 2018-06-28 RX ORDER — VENLAFAXINE HYDROCHLORIDE 37.5 MG/1
37.5 CAPSULE, EXTENDED RELEASE ORAL DAILY
Qty: 30 CAPSULE | Refills: 3 | Status: SHIPPED | OUTPATIENT
Start: 2018-06-28 | End: 2018-10-29

## 2018-06-28 RX ORDER — DICLOFENAC SODIUM 75 MG/1
75 TABLET, DELAYED RELEASE ORAL 2 TIMES DAILY
Qty: 60 TABLET | Refills: 2 | Status: SHIPPED | OUTPATIENT
Start: 2018-06-28 | End: 2018-08-15 | Stop reason: SDUPTHER

## 2018-06-28 RX ORDER — VENLAFAXINE HYDROCHLORIDE 150 MG/1
150 CAPSULE, EXTENDED RELEASE ORAL DAILY
Qty: 30 CAPSULE | Refills: 3 | Status: SHIPPED | OUTPATIENT
Start: 2018-06-28 | End: 2018-10-27 | Stop reason: SDUPTHER

## 2018-06-28 RX ORDER — HYDROCODONE BITARTRATE AND ACETAMINOPHEN 5; 325 MG/1; MG/1
1 TABLET ORAL 3 TIMES DAILY PRN
Qty: 90 TABLET | Refills: 0 | Status: SHIPPED | OUTPATIENT
Start: 2018-06-28 | End: 2018-06-28 | Stop reason: SDUPTHER

## 2018-06-28 NOTE — TELEPHONE ENCOUNTER
----- Message from Vasyl Patterson sent at 6/28/2018  2:02 PM CDT -----  Pharmacy Calling    Reason for call: [facility] states insurance will only fill 5 days for first fill of medication (which they will have to dispense it that way), and [facility] is asking the doctor send another script for 30 days. Pls fax to   [facility]arm[facility]: [facility]   Prescription Name: HYDROcodone-acetaminophen (NORCO) 5-325 mg per tablet  Phone Number: [phone]  Additional Information:

## 2018-06-28 NOTE — PROGRESS NOTES
Subjective:       Patient ID: Roxanne Templeton is a 53 y.o. female.    Chief Complaint: No chief complaint on file.    HPI    Ms. Templeton is a 53-year-old white female who has [facility] for chronic low back pain with history of lumbar radiculopathy.    Her last visit to the clinic was on 03/27/2018.  She was started on diclofenac,   venlafaxine, p.r.n. tizanidine and p.r.n. tramadol.    The patient comes today to the clinic for followup.  Her back pain has been   stable.  It is a constant grabbing and aching pain in the lumbar spine and   across her back.  She denies any significant radiation to lower extremities.    Her pain is worse with activity.  Her maximum pain is 9-10/10 and minimum   5-6/10.  Today, it is 5-6/10.  The patient denies any lower extremity weakness   or numbness.  She denies any bowel or bladder incontinence.    She is currently taking diclofenac 50 mg p.o. twice per day.  She is not able to   take the midday dose.  She take venlafaxine 75 mg p.o. twice per day instead of   3 times per day as prescribed.  She takes tizanidine 4 mg p.r.n., usually once   at bedtime.  She has been taking tramadol, but stopped it secondary to lack of   relief.      MS/IN  dd: 06/28/2018 13:58:27 (CDT)  td: 06/29/2018 05:19:07 (CDT)  Doc ID   #[id number]  Job ID #[id number]    CC:         Review of Systems   Constitutional: Negative for fatigue.   Eyes: Negative for visual disturbance.   Respiratory: Negative for shortness of breath.    Cardiovascular: Negative for chest pain.   Gastrointestinal: Negative for blood in stool, constipation, nausea and vomiting.   Genitourinary: Negative for difficulty urinating.   Musculoskeletal: Positive for back pain. Negative for arthralgias, gait problem and neck pain.   Neurological: Positive for dizziness. Negative for headaches.   Psychiatric/Behavioral: Negative for behavioral problems and sleep disturbance.       Objective:      Physical Exam    Constitutional: She appears well-developed and well-nourished. No distress.   Neck: Normal range of motion.   Musculoskeletal:   BLE:  ROM:   RLE: full.   LLE: full.  Knee crepitus:   RLE: mild.   LLE: mild.   Strength:    RLE: 5/5 at hip flexion, 5 knee extension, 5 ankle DF, 5 PF, 5 EHL.   LLE: 5/5 at hip flexion, 5 knee extension, 5 ankle DF, 5 PF, 5 EHL.  Sensation to pinprick:     RLE: intact.      LLE: intact.   DTR:     RLE: +3 knee, +2 ankle.    LLE: +3 knee, +2 ankle.  Clonus:    Rt ankle: -ve.    Lt ankle: -ve.  SLR (sitting):      RLE: -ve.      LLE: -ve.    Mild tenderness over lumbar spine.    Gait: WNL       Neurological: She is alert.   Psychiatric: She has a normal mood and affect. Her behavior is normal.   Vitals reviewed.        IMAGING STUDIES:      XR LUMBAR SPINE AP AND LAT WITH FLEX/EXT (3/27/18):    CLINICAL HISTORY:  Low back pain    FINDINGS:  No fracture dislocation bone destruction seen.  There is mild DJD.  No instability is seen.    Impression   See above          Assessment:       1. Chronic midline low back pain without sciatica    2. DDD (degenerative disc disease), lumbar        Plan:     - Start diclofenac (VOLTAREN) 75 MG EC tablet; Take 1 tablet (75 mg total) by mouth 2 (two) times daily.  - Start venlafaxine (EFFEXOR-XR) 150 MG Cp24; Take 1 capsule (150 mg total) by mouth once daily.  - Start venlafaxine (EFFEXOR-XR) 37.5 MG 24 hr capsule; Take 1 capsule (37.5 mg total) by mouth once daily. Take with the 150 mg capsule  - Start HYDROcodone-acetaminophen (NORCO) 5-325 mg per tablet; Take 1 tablet by mouth 3 (three) times daily as needed.  - Continue tiZANidine (ZANAFLEX) 4 MG tablet; Take 1 tablet (4 mg total) by mouth nightly as needed (muscle spasms).  - Regular home exercise program was encouraged.  - Follow-up in about 3 months (around 9/28/2018).

## 2018-07-05 ENCOUNTER — TELEPHONE (OUTPATIENT)
Dept: PHYSICAL MEDICINE AND REHAB | Facility: CLINIC | Age: 53
End: 2018-07-05

## 2018-07-05 DIAGNOSIS — G89.29 CHRONIC MIDLINE LOW BACK PAIN WITHOUT SCIATICA: ICD-10-CM

## 2018-07-05 DIAGNOSIS — M54.50 CHRONIC MIDLINE LOW BACK PAIN WITHOUT SCIATICA: ICD-10-CM

## 2018-07-05 RX ORDER — HYDROCODONE BITARTRATE AND ACETAMINOPHEN 5; 325 MG/1; MG/1
1 TABLET ORAL 3 TIMES DAILY PRN
Qty: 90 TABLET | Refills: 0 | Status: CANCELLED | OUTPATIENT
Start: 2018-07-05 | End: 2018-08-04

## 2018-07-05 NOTE — TELEPHONE ENCOUNTER
Medication expires 07/28/18        ----- Message from Alejandra Robertson sent at 7/5/2018  9:55 AM CDT -----  Rx Refill/Request     Is this a Refill or New Rx:  refill  Rx Name and Strength:  HYDROcodone-acetaminophen (NORCO) 5-325 mg per tablet  Preferred Pharmacy with phone number:     [facility]/pharmacy #[id number] - [facility] [address] [place]  [facility] [address]  Phone: [phone] Fax: [phone]    Communication Preference:phone# [phone]  Additional Information:

## 2018-07-09 ENCOUNTER — TELEPHONE (OUTPATIENT)
Dept: PHYSICAL MEDICINE AND REHAB | Facility: CLINIC | Age: 53
End: 2018-07-09

## 2018-07-09 NOTE — TELEPHONE ENCOUNTER
----- Message from Mani Erickson sent at 7/9/2018  9:20 AM CDT -----  Contact: Patient @ [phone]  Rx Refill/Request     Is this a Refill or New Rx:    Rx Name and Strength: HYDROcodone-acetaminophen (NORCO) 5-325 mg per tablet   Preferred Pharmacy with phone number:     [facility]/pharmacy #[id number] - [facility] [address] [place]  [facility] [address]  Phone: [phone] Fax: [phone]

## 2018-07-10 DIAGNOSIS — G89.29 CHRONIC MIDLINE LOW BACK PAIN WITHOUT SCIATICA: ICD-10-CM

## 2018-07-10 DIAGNOSIS — M54.50 CHRONIC MIDLINE LOW BACK PAIN WITHOUT SCIATICA: ICD-10-CM

## 2018-07-10 RX ORDER — HYDROCODONE BITARTRATE AND ACETAMINOPHEN 5; 325 MG/1; MG/1
1 TABLET ORAL 3 TIMES DAILY PRN
Qty: 75 TABLET | Refills: 0 | Status: SHIPPED | OUTPATIENT
Start: 2018-07-10 | End: 2018-08-13 | Stop reason: SDUPTHER

## 2018-07-11 ENCOUNTER — TELEPHONE (OUTPATIENT)
Dept: INTERNAL MEDICINE | Facility: CLINIC | Age: 53
End: 2018-07-11

## 2018-07-11 NOTE — TELEPHONE ENCOUNTER
Spoke with pt and informed that you recommend a 6 month lab follow up based on last cholesterol results . Pt mentioned that's what she though but wasn't certain.

## 2018-07-11 NOTE — TELEPHONE ENCOUNTER
Spoke with pt. Pt would like to know when she has to get labs checked again, particularly for cholesterol levels, done again? Pt assumes that she had to come back in about 6 months. Per pt chart, pt's last set of labs that were ordered by Dr. Wheeler were done on 06/29/15 which were fasting.

## 2018-07-11 NOTE — TELEPHONE ENCOUNTER
I called the pt.  I told her she can ask her insurance company about quantity restrictions and need for a prior authorization.  I sent a prescription for 25 days (75 tablets) of hydrocodone to [facility] in case she wants to pay cash.

## 2018-07-11 NOTE — TELEPHONE ENCOUNTER
----- Message from Halina Olivier sent at 7/10/2018  2:05 PM CDT -----  Contact: self/[phone]  Patient called in regards needing to find out when she is suppose to have done labs to check her cholesterol level. Please call and advise. Thank you!!!

## 2018-08-13 DIAGNOSIS — G89.29 CHRONIC MIDLINE LOW BACK PAIN WITHOUT SCIATICA: ICD-10-CM

## 2018-08-13 DIAGNOSIS — M54.50 CHRONIC MIDLINE LOW BACK PAIN WITHOUT SCIATICA: ICD-10-CM

## 2018-08-13 NOTE — TELEPHONE ENCOUNTER
06/25/18 last Rx refill-Zanaflex  07/10/18 last Rx refill- Hydrocodone  06/28/18 last office visit  10/09/18 RTC        ----- Message from Mani Erickson sent at 8/13/2018 10:06 AM CDT -----  Contact: Patient @ [phone]  Patient is calling to get an medication refill for ( HYDROCODONE 5-325mg ) ( tiZANidine (ZANAFLEX) 4 MG tablet )    [facility]/pharmacy #[id number] - [facility] [address] [facility]  [address]  [facility] [address]  Phone: [phone] Fax: [phone]

## 2018-08-14 RX ORDER — HYDROCODONE BITARTRATE AND ACETAMINOPHEN 5; 325 MG/1; MG/1
1 TABLET ORAL 3 TIMES DAILY PRN
Qty: 75 TABLET | Refills: 0 | Status: SHIPPED | OUTPATIENT
Start: 2018-08-14 | End: 2018-09-11 | Stop reason: SDUPTHER

## 2018-08-14 RX ORDER — TIZANIDINE 4 MG/1
4 TABLET ORAL NIGHTLY PRN
Qty: 30 TABLET | Refills: 2 | Status: SHIPPED | OUTPATIENT
Start: 2018-08-14 | End: 2018-09-13

## 2018-08-15 DIAGNOSIS — G89.29 CHRONIC MIDLINE LOW BACK PAIN WITHOUT SCIATICA: ICD-10-CM

## 2018-08-15 DIAGNOSIS — M54.50 CHRONIC MIDLINE LOW BACK PAIN WITHOUT SCIATICA: ICD-10-CM

## 2018-08-15 RX ORDER — DICLOFENAC SODIUM 75 MG/1
75 TABLET, DELAYED RELEASE ORAL 2 TIMES DAILY
Qty: 60 TABLET | Refills: 2 | Status: SHIPPED | OUTPATIENT
Start: 2018-08-15 | End: 2018-11-20 | Stop reason: SDUPTHER

## 2018-09-09 RX ORDER — TIZANIDINE 4 MG/1
4 TABLET ORAL NIGHTLY PRN
Qty: 30 TABLET | Refills: 2 | Status: SHIPPED | OUTPATIENT
Start: 2018-09-09 | End: 2018-10-09

## 2018-09-11 DIAGNOSIS — M54.50 CHRONIC MIDLINE LOW BACK PAIN WITHOUT SCIATICA: ICD-10-CM

## 2018-09-11 DIAGNOSIS — G89.29 CHRONIC MIDLINE LOW BACK PAIN WITHOUT SCIATICA: ICD-10-CM

## 2018-09-11 RX ORDER — HYDROCODONE BITARTRATE AND ACETAMINOPHEN 5; 325 MG/1; MG/1
1 TABLET ORAL 3 TIMES DAILY PRN
Qty: 75 TABLET | Refills: 0 | Status: SHIPPED | OUTPATIENT
Start: 2018-09-13 | End: 2018-10-09 | Stop reason: SDUPTHER

## 2018-09-11 NOTE — TELEPHONE ENCOUNTER
08/14/18 last Rx refill  06/28/18 last office visit  10/09/18 RTC        ----- Message from Farhat Ro sent at 9/11/2018  2:28 PM CDT -----  Contact: Pt  Pt needs a refill on hydrocodone 5-325mg called into [facility] pharmacy at [phone].    Pt can be reached at [phone].    Thanks

## 2018-10-04 ENCOUNTER — TELEPHONE (OUTPATIENT)
Dept: INTERNAL MEDICINE | Facility: CLINIC | Age: 53
End: 2018-10-04

## 2018-10-04 DIAGNOSIS — F17.200 SMOKING: Primary | ICD-10-CM

## 2018-10-04 RX ORDER — NICOTINE 7MG/24HR
1 PATCH, TRANSDERMAL 24 HOURS TRANSDERMAL DAILY
Qty: 14 PATCH | Refills: 0 | Status: SHIPPED | OUTPATIENT
Start: 2018-10-04 | End: 2018-12-18

## 2018-10-04 NOTE — TELEPHONE ENCOUNTER
Pt stated that she stopped smoking Sunday night at 8 pm, before that she was smoking 1 pack a day.

## 2018-10-04 NOTE — TELEPHONE ENCOUNTER
Recommend she do the low dose patches (7mg) since she has stopped for several days and was smoking 1 pack per day.   I will send to pharmacy though I'm not sure if it's covered - if not she can buy OTC.

## 2018-10-04 NOTE — TELEPHONE ENCOUNTER
Please verify how much pt is curretnly smoking. Want to make sure have the right dose patch. Thanks.

## 2018-10-04 NOTE — TELEPHONE ENCOUNTER
----- Message from Fiona Oh sent at 10/3/2018  1:07 PM CDT -----  Contact: Patient  [phone]  RX request - refill or new RX.  Is this a refill or new RX:  new  RX name and strength: Nicotine transdermal patch system 21 mg  Directions:   Is this a 30 day or 90 day RX:  90  Local pharmacy or mail order pharmacy:  [facility]/pharmacy #[id number] - [place], [place] - [address] [facility]       Comments:  Patient stopped smoking and would like to stop    Please call and advise  Thank you

## 2018-10-09 ENCOUNTER — OFFICE VISIT (OUTPATIENT)
Dept: PHYSICAL MEDICINE AND REHAB | Facility: CLINIC | Age: 53
End: 2018-10-09
Payer: COMMERCIAL

## 2018-10-09 VITALS
BODY MASS INDEX: 21.84 KG/M2 | HEART RATE: 91 BPM | WEIGHT: 147.5 LBS | DIASTOLIC BLOOD PRESSURE: 71 MMHG | SYSTOLIC BLOOD PRESSURE: 112 MMHG | HEIGHT: 69 IN

## 2018-10-09 DIAGNOSIS — M54.50 CHRONIC MIDLINE LOW BACK PAIN WITHOUT SCIATICA: Primary | ICD-10-CM

## 2018-10-09 DIAGNOSIS — G89.29 CHRONIC MIDLINE LOW BACK PAIN WITHOUT SCIATICA: Primary | ICD-10-CM

## 2018-10-09 DIAGNOSIS — M51.36 DDD (DEGENERATIVE DISC DISEASE), LUMBAR: ICD-10-CM

## 2018-10-09 PROCEDURE — 99214 OFFICE O/P EST MOD 30 MIN: CPT | Mod: S$GLB,,, | Performed by: PHYSICAL MEDICINE & REHABILITATION

## 2018-10-09 PROCEDURE — 99999 PR PBB SHADOW E&M-EST. PATIENT-LVL III: CPT | Mod: PBBFAC,,, | Performed by: PHYSICAL MEDICINE & REHABILITATION

## 2018-10-09 PROCEDURE — 3008F BODY MASS INDEX DOCD: CPT | Mod: CPTII,S$GLB,, | Performed by: PHYSICAL MEDICINE & REHABILITATION

## 2018-10-09 RX ORDER — HYDROCODONE BITARTRATE AND ACETAMINOPHEN 5; 325 MG/1; MG/1
1 TABLET ORAL 3 TIMES DAILY PRN
Qty: 75 TABLET | Refills: 0 | Status: SHIPPED | OUTPATIENT
Start: 2018-10-18 | End: 2018-11-12

## 2018-10-09 RX ORDER — VENLAFAXINE HYDROCHLORIDE 150 MG/1
150 CAPSULE, EXTENDED RELEASE ORAL DAILY
Start: 2018-10-09 | End: 2018-10-29

## 2018-10-09 NOTE — PROGRESS NOTES
Subjective:       Patient ID: Roxanne Templeton is a 53 y.o. female.    Chief Complaint: No chief complaint on file.    HPI    HISTORY OF PRESENT ILLNESS:  Mrs. Templeton is a 53-year-old white female who is   followed up in this clinic for chronic low back pain with history of lumbar   radiculopathy.  Her last visit to the clinic was on 06/28/2018.  She was   maintained on diclofenac, venlafaxine, p.r.n. hydrocodone/APAP and p.r.n.   tizanidine.    The patient is coming today to the clinic for followup.  She reports that her   low back pain has been under good control.  It is an almost constant aching pain   in the lumbar spine and across her back.  She has occasional shooting pain to   the left foot with numbness.  These episodes happen 3-4 days per month and are   usually mild.  Her pain is aggravated by activity, but also by prolonged   sitting.  It is better with her medications.  Her maximum pain is 7-8/10 and   minimum 3-4/10.  Today, it is 4/10.  The patient denies any lower extremity   weakness.  She denies any bowel or bladder incontinence.    She is currently taking diclofenac 75 mg p.o. twice per day, venlafaxine    mg capsules and 37.5 mg capsules once per day.  She takes hydrocodone/APAP 5/325   p.r.n. for pain, usually 3-4 times per day on bad days (her 75 tablets usually   last her 4-5 weeks).  She takes tizanidine 4 mg p.r.n., usually once or twice   per day.  She has been exercising regularly.  She quit smoking.      MS/HN  dd: 10/09/2018 09:10:18 (CDT)  td: 10/10/2018 02:11:01 (CDT)  Doc ID   #[id number]  Job ID #[id number]    CC:         Review of Systems   Constitutional: Negative for fatigue.   Eyes: Negative for visual disturbance.   Respiratory: Negative for shortness of breath.    Cardiovascular: Negative for chest pain.   Gastrointestinal: Negative for blood in stool, constipation, nausea and vomiting.   Genitourinary: Negative for difficulty urinating.   Musculoskeletal: Positive for back  pain. Negative for arthralgias, gait problem and neck pain.   Neurological: Positive for dizziness. Negative for headaches.   Psychiatric/Behavioral: Negative for behavioral problems and sleep disturbance.       Objective:      Physical Exam   Constitutional: She appears well-developed and well-nourished. No distress.   Neck: Normal range of motion.   Musculoskeletal:   BLE:  ROM:   RLE: full.   LLE: full.  Knee crepitus:   RLE: mild.   LLE: mild.   Strength:    RLE: 5/5 at hip flexion, 5 knee extension, 5 ankle DF, 5 PF, 5 EHL.   LLE: 5/5 at hip flexion, 5 knee extension, 5 ankle DF, 5 PF, 5 EHL.  Sensation to pinprick:     RLE: intact.      LLE: intact.   DTR:     RLE: +3 knee, +2 ankle.    LLE: +3 knee, +2 ankle.  Clonus:    Rt ankle: -ve.    Lt ankle: -ve.  SLR (sitting):      RLE: -ve.      LLE: -ve.    Mild tenderness over lumbar spine.    Gait: WNL       Neurological: She is alert.   Psychiatric: She has a normal mood and affect. Her behavior is normal.   Vitals reviewed.            Assessment:       1. Chronic midline low back pain without sciatica    2. DDD (degenerative disc disease), lumbar        Plan:     - Continue diclofenac (VOLTAREN) 75 MG EC tablet; Take 1 tablet (75 mg total) by mouth 2 (two) times daily.  - Continue venlafaxine (EFFEXOR-XR) 150 MG Cp24 and 37.5 MG 24 hr capsule; Take 1 capsule of each (150 mg total) by mouth once daily.  - Continue HYDROcodone-acetaminophen (NORCO) 5-325 mg per tablet; Take 1 tablet by mouth 3 (three) times daily as needed.  - Continue tiZANidine (ZANAFLEX) 4 MG tablet; Take 1 tablet (4 mg total) by mouth nightly as needed (muscle spasms).  - Regular home exercise program was encouraged.  - Follow-up in about 4 months (around 2/9/2019).     This was a 25 minute visit, more than 50% of which was spent counseling the patient about the diagnosis and the treatment plan.

## 2018-10-18 ENCOUNTER — TELEPHONE (OUTPATIENT)
Dept: PHYSICAL MEDICINE AND REHAB | Facility: CLINIC | Age: 53
End: 2018-10-18

## 2018-10-18 NOTE — TELEPHONE ENCOUNTER
----- Message from Alejandra Robertson sent at 10/18/2018 12:58 PM CDT -----  Needs Advice    Reason for call:needing a letter from the doctor regarding her getting out of Jury Duty. Please call.        Communication Preference:pt @ [phone]    Additional Information:

## 2018-10-24 ENCOUNTER — OFFICE VISIT (OUTPATIENT)
Dept: URGENT CARE | Facility: CLINIC | Age: 53
End: 2018-10-24
Payer: COMMERCIAL

## 2018-10-24 VITALS
WEIGHT: 145 LBS | RESPIRATION RATE: 20 BRPM | TEMPERATURE: 97 F | BODY MASS INDEX: 21.48 KG/M2 | SYSTOLIC BLOOD PRESSURE: 110 MMHG | HEIGHT: 69 IN | OXYGEN SATURATION: 98 % | DIASTOLIC BLOOD PRESSURE: 79 MMHG | HEART RATE: 82 BPM

## 2018-10-24 DIAGNOSIS — J00 ACUTE NASOPHARYNGITIS: ICD-10-CM

## 2018-10-24 DIAGNOSIS — J02.9 SORE THROAT: Primary | ICD-10-CM

## 2018-10-24 LAB
CTP QC/QA: YES
S PYO RRNA THROAT QL PROBE: NEGATIVE

## 2018-10-24 PROCEDURE — 99214 OFFICE O/P EST MOD 30 MIN: CPT | Mod: S$GLB,,, | Performed by: PHYSICIAN ASSISTANT

## 2018-10-24 PROCEDURE — 87880 STREP A ASSAY W/OPTIC: CPT | Mod: QW,S$GLB,, | Performed by: PHYSICIAN ASSISTANT

## 2018-10-24 RX ORDER — TIZANIDINE 4 MG/1
4 TABLET ORAL
Refills: 2 | COMMUNITY
Start: 2018-10-15 | End: 2018-11-14 | Stop reason: SDUPTHER

## 2018-10-24 RX ORDER — DICLOFENAC SODIUM 75 MG/1
75 TABLET, DELAYED RELEASE ORAL 2 TIMES DAILY
Refills: 2 | COMMUNITY
Start: 2018-09-18 | End: 2019-02-11 | Stop reason: SDUPTHER

## 2018-10-24 NOTE — PROGRESS NOTES
"Subjective:       Patient ID: Roxanne Templeton is a 53 y.o. female.    Vitals:  height is 5' 9" (1.753 m) and weight is 65.8 kg (145 lb). Her tympanic temperature is 96.7 °F (35.9 °C). Her blood pressure is 110/79 and her pulse is 82. Her respiration is 20 and oxygen saturation is 98%.     Chief Complaint: Sore Throat    This is a 53 y.o. female   with Past Medical History:  No date: Allergy  No date: Chronic back pain   and Past Surgical History:  6/11/2013: COLONOSCOPY; N/A      Comment:  Performed by Dustin Owens MD at [facility] (4TH FLR)  No date: FOOT SURGERY; Left      Comment:  toe  1995: HYSTERECTOMY      Comment:  [facility] ([facility] ovaries remain  No date: TUBAL LIGATION  who presents today with a chief complaint of a sore throat and congestion with a cough.  She has had these symptoms for past two weeks. She says her sore throat got worse 2 days ago.  She took mucinex and cough drops to help with her symptoms.  She quit smoking approximately 3 weeks ago.      Sore Throat    This is a new problem. The current episode started 1 to 4 weeks ago. The problem has been gradually worsening. The pain is worse on the left side. There has been no fever. The pain is at a severity of 3/10. The pain is mild. Associated symptoms include coughing, headaches and a hoarse voice. Pertinent negatives include no abdominal pain, congestion, ear pain or shortness of breath. Treatments tried: mucinex. The treatment provided mild relief.     Review of Systems   Constitution: Positive for chills and malaise/fatigue. Negative for fever.   HENT: Positive for hoarse voice and sore throat. Negative for congestion and ear pain.    Eyes: Negative for discharge and redness.   Cardiovascular: Negative for chest pain, dyspnea on exertion and leg swelling.   Respiratory: Positive for cough and sputum production. Negative for shortness of breath and wheezing.    Musculoskeletal: Negative for myalgias.   Gastrointestinal: Negative for " abdominal pain and nausea.   Neurological: Positive for headaches.       Objective:      Physical Exam   Constitutional: She is oriented to person, place, and time. She appears well-developed and well-nourished.   HENT:   Head: Normocephalic and atraumatic.   Right Ear: Hearing, tympanic membrane, external ear and ear canal normal.   Left Ear: Hearing, tympanic membrane, external ear and ear canal normal.   Nose: Mucosal edema and rhinorrhea present. Right sinus exhibits no maxillary sinus tenderness and no frontal sinus tenderness. Left sinus exhibits no maxillary sinus tenderness and no frontal sinus tenderness.   Mouth/Throat: Uvula is midline. Posterior oropharyngeal erythema present. No oropharyngeal exudate or posterior oropharyngeal edema.   Eyes: Conjunctivae are normal.   Neck: Normal range of motion. Neck supple. No thyromegaly present.   Cardiovascular: Normal rate and regular rhythm. Exam reveals no gallop and no friction rub.   No murmur heard.  Pulmonary/Chest: Effort normal and breath sounds normal. She has no wheezes. She has no rales.   Musculoskeletal: Normal range of motion.   Lymphadenopathy:     She has no cervical adenopathy.   Neurological: She is alert and oriented to person, place, and time.   Skin: Skin is warm and dry. No rash noted. No erythema.   Psychiatric: She has a normal mood and affect. Her behavior is normal. Judgment and thought content normal.   Nursing note and vitals reviewed.      Results for orders placed or performed in visit on 10/24/18   POCT rapid strep A   Result Value Ref Range    Rapid Strep A Screen Negative Negative     Acceptable Yes        Assessment:       1. Sore throat    2. Acute nasopharyngitis        Plan:         Sore throat  -     POCT rapid strep A    Acute nasopharyngitis        Roxanne was seen today for sore throat.    Diagnoses and all orders for this visit:    Sore throat  -     POCT rapid strep A    Acute nasopharyngitis      Patient  Instructions     - Rest.    - Drink plenty of fluids.    - Tylenol or Ibuprofen as directed as needed for fever/pain.    - Take over-the-counter claritin, zyrtec, allegra, or xyzal as directed.  You may use a decongestant form (D) of this medication if you DO NOT have a history of hypertension or heart disease.  - Use over the counter Flonase as directed for sinus congestion and postnasal drip.  - use nasal saline prior to Flonase.  - Use Ocean Spray Nasal Saline 1-3 puffs each nostril every 2-3 hours then blow out onto tissue. This is to irrigate the nasal passage way to clear the sinus openings. Use until sinus problem resolved.   - Antibiotics are not needed at this time.  - Usual course of cold symptom is 10-14 days, but longer if patient is a smoker.   - Use salt water gargle for sore throat.   - Follow up with your PCP or specialty clinic as directed in the next 1-2 weeks if not improved or as needed.  You can call [phone] to schedule an appointment with the appropriate provider.    - Go to the ED if your symptoms worsen.  - Rest.    - Drink plenty of fluids.    - Tylenol or Ibuprofen as directed as needed for fever/pain.    - Follow up with your PCP or specialty clinic as directed in the next 1-2 weeks if not improved or as needed.  You can call [phone] to schedule an appointment with the appropriate provider.    - Go to the ED if your symptoms worsen.    Adult Self-Care for Colds  Colds are caused by viruses. They can't be cured with antibiotics. However, you can ease symptoms and support your body's efforts to heal itself.  No matter which symptoms you have, be sure to:  · Drink plenty of fluids (water or clear soup)  · Stop smoking and drinking alcohol  · Get plenty of rest    Understand a fever  · Take your temperature several times a day. If your fever is 100.4°F (38.0°C) for more than a day, call your healthcare provider.  · Relax, lie down. Go to bed if you want. Just get off your feet  and rest. Also, drink plenty of fluids to avoid dehydration.  · Take acetaminophen or a nonsteroidal anti-inflammatory agent (NSAID), such as ibuprofen.  Treat a troubled nose kindly  · Breathe steam or heated humidified air to open blocked nasal passages.  a hot shower or use a vaporizer. Be careful not to get burned by the steam.  · Saline nasal sprays and decongestant tablets help open a stuffy nose. Antihistamines can also help, but they can cause side effects such as drowsiness and drying of the eyes, nose, and mouth.  Soothe a sore throat and cough  · Gargle every 2 hours with 1/4 teaspoon of salt dissolved in 1/2 cup of warm water. Suck on throat lozenges and cough drops to moisten your throat.  · Cough medicines are available but it is unclear how well they actually work.  · Take acetaminophen or an NSAID, such as ibuprofen, to ease throat pain  Ease digestive problems  · Put fluids back into your body. Take frequent sips of clear liquids such as water or broth. Avoid drinks that have a lot of sugar in them, such as juices and sodas. These can make diarrhea worse. Older children and adults can drink sports drinks.  · As your appetite returns, you can resume your normal diet. Ask your healthcare provider if there are any foods you should avoid.  When to seek medical care  When you first notice symptoms, ask your healthcare provider if antiviral medicines are appropriate. Antibiotics should not be taken for colds or flu. Also, call your healthcare provider if you have any of the following symptoms or if you aren't feeling better after 7 days:  · Shortness of breath  · Pain or pressure in the chest or belly (abdomen)  · Worsening symptoms, especially after a period of improvement  · Fever of 100.4°F  (38.0°C) or higher, or fever that doesn't go down with medicine  · Sudden dizziness or confusion  · Severe or continued vomiting  · Signs of dehydration, including extreme thirst, dark urine, infrequent  urination, dry mouth  · Spotted, red, or very sore throat   Date Last Reviewed: 12/1/2016  © 7887-8583 [employer]. [address] [place] [address], [place] [address]. All rights reserved. This information is not intended as a substitute for professional medical care. Always follow your healthcare professional's instructions.        Self-Care for Sore Throats    Sore throats happen for many reasons, such as colds, allergies, and infections caused by viruses or bacteria. In any case, your throat becomes red and sore. Your goal for self-care is to reduce your discomfort while giving your throat a chance to heal.  Moisten and soothe your throat  Tips include the following:  · Try a sip of water first thing after waking up.  · Keep your throat moist by drinking 6 or more glasses of clear liquids every day.  · Run a cool-air humidifier in your room overnight.  · Avoid cigarette smoke.   · Suck on throat lozenges, cough drops, hard candy, ice chips, or frozen fruit-juice bars. Use the sugar-free versions if your diet or medical condition requires them.  Gargle to ease irritation  Gargling every hour or 2 can ease irritation. Try gargling with 1 of these solutions:  · 1/4 teaspoon of salt in 1/2 cup of warm water  · An over-the-counter anesthetic gargle  Use medicine for more relief  Over-the-counter medicine can reduce sore throat symptoms. Ask your pharmacist if you have questions about which medicine to use:  · Ease pain with anesthetic sprays. Aspirin or an aspirin substitute also helps. Remember, never give aspirin to anyone 18 or younger, or if you are already taking blood thinners.   · For sore throats caused by allergies, try antihistamines to block the allergic reaction.  · Remember: unless a sore throat is caused by a bacterial infection, antibiotics wont help you.  Prevent future sore throats  Prevention tips include the following:  · Stop smoking or reduce contact with secondhand smoke. Smoke irritates  the tender throat lining.  · Limit contact with pets and with allergy-causing substances, such as pollen and mold.  · When youre around someone with a sore throat or cold, wash your hands often to keep viruses or bacteria from spreading.  · Dont strain your vocal cords.  Call your healthcare provider  Contact your healthcare provider if you have:  · A temperature over 101°F (38.3°C)  · White spots on the throat  · Great difficulty swallowing  · Trouble breathing  · A skin rash  · Recent exposure to someone else with strep bacteria  · Severe hoarseness and swollen glands in the neck or jaw   Date Last Reviewed: 8/1/2016 © 2000-2017 [employer]. [address] [place] [address], [place] [address]. All rights reserved. This information is not intended as a substitute for professional medical care. Always follow your healthcare professional's instructions.        Viral Upper Respiratory Illness (Adult)  You have a viral upper respiratory illness (URI), which is another term for the common cold. This illness is contagious during the first few days. It is spread through the air by coughing and sneezing. It may also be spread by direct contact (touching the sick person and then touching your own eyes, nose, or mouth). Frequent handwashing will decrease risk of spread. Most viral illnesses go away within 7 to 10 days with rest and simple home remedies. Sometimes the illness may last for several weeks. Antibiotics will not kill a virus, and they are generally not prescribed for this condition.    Home care  · If symptoms are severe, rest at home for the first 2 to 3 days. When you resume activity, don't let yourself get too tired.  · Avoid being exposed to cigarette smoke (yours or others).  · You may use acetaminophen or ibuprofen to control pain and fever, unless another medicine was prescribed. (Note: If you have chronic liver or kidney disease, have ever had a stomach ulcer or gastrointestinal bleeding, or are  taking blood-thinning medicines, talk with your healthcare provider before using these medicines.) Aspirin should never be given to anyone under 18 years of age who is ill with a viral infection or fever. It may cause severe liver or brain damage.  · Your appetite may be poor, so a light diet is fine. Avoid dehydration by drinking 6 to 8 glasses of fluids per day (water, soft drinks, juices, tea, or soup). Extra fluids will help loosen secretions in the nose and lungs.  · Over-the-counter cold medicines will not shorten the length of time youre sick, but they may be helpful for the following symptoms: cough, sore throat, and nasal and sinus congestion. (Note: Do not use decongestants if you have high blood pressure.)  Follow-up care  Follow up with your healthcare provider, or as advised.  When to seek medical advice  Call your healthcare provider right away if any of these occur:  · Cough with lots of colored sputum (mucus)  · Severe headache; face, neck, or ear pain  · Difficulty swallowing due to throat pain  · Fever of 100.4°F (38°C)  Call 911, or get immediate medical care  Call emergency services right away if any of these occur:  · Chest pain, shortness of breath, wheezing, or difficulty breathing  · Coughing up blood  · Inability to swallow due to throat pain  Date Last Reviewed: 9/13/2015  © 7147-4745 [employer]. [address] [place] [address], [place] [address]. All rights reserved. This information is not intended as a substitute for professional medical care. Always follow your healthcare professional's instructions.

## 2018-10-24 NOTE — PATIENT INSTRUCTIONS
- Rest.    - Drink plenty of fluids.    - Tylenol or Ibuprofen as directed as needed for fever/pain.    - Take over-the-counter claritin, zyrtec, allegra, or xyzal as directed.  You may use a decongestant form (D) of this medication if you DO NOT have a history of hypertension or heart disease.  - Use over the counter Flonase as directed for sinus congestion and postnasal drip.  - use nasal saline prior to Flonase.  - Use Ocean Spray Nasal Saline 1-3 puffs each nostril every 2-3 hours then blow out onto tissue. This is to irrigate the nasal passage way to clear the sinus openings. Use until sinus problem resolved.   - Antibiotics are not needed at this time.  - Usual course of cold symptom is 10-14 days, but longer if patient is a smoker.   - Use salt water gargle for sore throat.   - Follow up with your PCP or specialty clinic as directed in the next 1-2 weeks if not improved or as needed.  You can call [phone] to schedule an appointment with the appropriate provider.    - Go to the ED if your symptoms worsen.  - Rest.    - Drink plenty of fluids.    - Tylenol or Ibuprofen as directed as needed for fever/pain.    - Follow up with your PCP or specialty clinic as directed in the next 1-2 weeks if not improved or as needed.  You can call [phone] to schedule an appointment with the appropriate provider.    - Go to the ED if your symptoms worsen.    Adult Self-Care for Colds  Colds are caused by viruses. They can't be cured with antibiotics. However, you can ease symptoms and support your body's efforts to heal itself.  No matter which symptoms you have, be sure to:  · Drink plenty of fluids (water or clear soup)  · Stop smoking and drinking alcohol  · Get plenty of rest    Understand a fever  · Take your temperature several times a day. If your fever is 100.4°F (38.0°C) for more than a day, call your healthcare provider.  · Relax, lie down. Go to bed if you want. Just get off your feet and rest. Also,  drink plenty of fluids to avoid dehydration.  · Take acetaminophen or a nonsteroidal anti-inflammatory agent (NSAID), such as ibuprofen.  Treat a troubled nose kindly  · Breathe steam or heated humidified air to open blocked nasal passages.  a hot shower or use a vaporizer. Be careful not to get burned by the steam.  · Saline nasal sprays and decongestant tablets help open a stuffy nose. Antihistamines can also help, but they can cause side effects such as drowsiness and drying of the eyes, nose, and mouth.  Soothe a sore throat and cough  · Gargle every 2 hours with 1/4 teaspoon of salt dissolved in 1/2 cup of warm water. Suck on throat lozenges and cough drops to moisten your throat.  · Cough medicines are available but it is unclear how well they actually work.  · Take acetaminophen or an NSAID, such as ibuprofen, to ease throat pain  Ease digestive problems  · Put fluids back into your body. Take frequent sips of clear liquids such as water or broth. Avoid drinks that have a lot of sugar in them, such as juices and sodas. These can make diarrhea worse. Older children and adults can drink sports drinks.  · As your appetite returns, you can resume your normal diet. Ask your healthcare provider if there are any foods you should avoid.  When to seek medical care  When you first notice symptoms, ask your healthcare provider if antiviral medicines are appropriate. Antibiotics should not be taken for colds or flu. Also, call your healthcare provider if you have any of the following symptoms or if you aren't feeling better after 7 days:  · Shortness of breath  · Pain or pressure in the chest or belly (abdomen)  · Worsening symptoms, especially after a period of improvement  · Fever of 100.4°F  (38.0°C) or higher, or fever that doesn't go down with medicine  · Sudden dizziness or confusion  · Severe or continued vomiting  · Signs of dehydration, including extreme thirst, dark urine, infrequent urination, dry  mouth  · Spotted, red, or very sore throat   Date Last Reviewed: 12/1/2016  © 9295-3235 [employer]. [address] [place] [address], [place] [address]. All rights reserved. This information is not intended as a substitute for professional medical care. Always follow your healthcare professional's instructions.        Self-Care for Sore Throats    Sore throats happen for many reasons, such as colds, allergies, and infections caused by viruses or bacteria. In any case, your throat becomes red and sore. Your goal for self-care is to reduce your discomfort while giving your throat a chance to heal.  Moisten and soothe your throat  Tips include the following:  · Try a sip of water first thing after waking up.  · Keep your throat moist by drinking 6 or more glasses of clear liquids every day.  · Run a cool-air humidifier in your room overnight.  · Avoid cigarette smoke.   · Suck on throat lozenges, cough drops, hard candy, ice chips, or frozen fruit-juice bars. Use the sugar-free versions if your diet or medical condition requires them.  Gargle to ease irritation  Gargling every hour or 2 can ease irritation. Try gargling with 1 of these solutions:  · 1/4 teaspoon of salt in 1/2 cup of warm water  · An over-the-counter anesthetic gargle  Use medicine for more relief  Over-the-counter medicine can reduce sore throat symptoms. Ask your pharmacist if you have questions about which medicine to use:  · Ease pain with anesthetic sprays. Aspirin or an aspirin substitute also helps. Remember, never give aspirin to anyone 18 or younger, or if you are already taking blood thinners.   · For sore throats caused by allergies, try antihistamines to block the allergic reaction.  · Remember: unless a sore throat is caused by a bacterial infection, antibiotics wont help you.  Prevent future sore throats  Prevention tips include the following:  · Stop smoking or reduce contact with secondhand smoke. Smoke irritates the tender throat  lining.  · Limit contact with pets and with allergy-causing substances, such as pollen and mold.  · When youre around someone with a sore throat or cold, wash your hands often to keep viruses or bacteria from spreading.  · Dont strain your vocal cords.  Call your healthcare provider  Contact your healthcare provider if you have:  · A temperature over 101°F (38.3°C)  · White spots on the throat  · Great difficulty swallowing  · Trouble breathing  · A skin rash  · Recent exposure to someone else with strep bacteria  · Severe hoarseness and swollen glands in the neck or jaw   Date Last Reviewed: 8/1/2016 © 2000-2017 [employer]. [address] [place] [address], [place] [address]. All rights reserved. This information is not intended as a substitute for professional medical care. Always follow your healthcare professional's instructions.        Viral Upper Respiratory Illness (Adult)  You have a viral upper respiratory illness (URI), which is another term for the common cold. This illness is contagious during the first few days. It is spread through the air by coughing and sneezing. It may also be spread by direct contact (touching the sick person and then touching your own eyes, nose, or mouth). Frequent handwashing will decrease risk of spread. Most viral illnesses go away within 7 to 10 days with rest and simple home remedies. Sometimes the illness may last for several weeks. Antibiotics will not kill a virus, and they are generally not prescribed for this condition.    Home care  · If symptoms are severe, rest at home for the first 2 to 3 days. When you resume activity, don't let yourself get too tired.  · Avoid being exposed to cigarette smoke (yours or others).  · You may use acetaminophen or ibuprofen to control pain and fever, unless another medicine was prescribed. (Note: If you have chronic liver or kidney disease, have ever had a stomach ulcer or gastrointestinal bleeding, or are taking  blood-thinning medicines, talk with your healthcare provider before using these medicines.) Aspirin should never be given to anyone under 18 years of age who is ill with a viral infection or fever. It may cause severe liver or brain damage.  · Your appetite may be poor, so a light diet is fine. Avoid dehydration by drinking 6 to 8 glasses of fluids per day (water, soft drinks, juices, tea, or soup). Extra fluids will help loosen secretions in the nose and lungs.  · Over-the-counter cold medicines will not shorten the length of time youre sick, but they may be helpful for the following symptoms: cough, sore throat, and nasal and sinus congestion. (Note: Do not use decongestants if you have high blood pressure.)  Follow-up care  Follow up with your healthcare provider, or as advised.  When to seek medical advice  Call your healthcare provider right away if any of these occur:  · Cough with lots of colored sputum (mucus)  · Severe headache; face, neck, or ear pain  · Difficulty swallowing due to throat pain  · Fever of 100.4°F (38°C)  Call 911, or get immediate medical care  Call emergency services right away if any of these occur:  · Chest pain, shortness of breath, wheezing, or difficulty breathing  · Coughing up blood  · Inability to swallow due to throat pain  Date Last Reviewed: 9/13/2015  © 0776-8496 [employer]. [address] [place] [address], [place] [address]. All rights reserved. This information is not intended as a substitute for professional medical care. Always follow your healthcare professional's instructions.

## 2018-10-27 DIAGNOSIS — G89.29 CHRONIC MIDLINE LOW BACK PAIN WITHOUT SCIATICA: ICD-10-CM

## 2018-10-27 DIAGNOSIS — M54.50 CHRONIC MIDLINE LOW BACK PAIN WITHOUT SCIATICA: ICD-10-CM

## 2018-10-28 RX ORDER — VENLAFAXINE HYDROCHLORIDE 150 MG/1
150 CAPSULE, EXTENDED RELEASE ORAL DAILY
Qty: 30 CAPSULE | Refills: 3 | Status: SHIPPED | OUTPATIENT
Start: 2018-10-28 | End: 2019-02-11 | Stop reason: SDUPTHER

## 2018-10-28 RX ORDER — VENLAFAXINE HYDROCHLORIDE 37.5 MG/1
37.5 CAPSULE, EXTENDED RELEASE ORAL DAILY
Qty: 30 CAPSULE | Refills: 3 | OUTPATIENT
Start: 2018-10-28 | End: 2019-10-28

## 2018-10-29 ENCOUNTER — TELEPHONE (OUTPATIENT)
Dept: PHYSICAL MEDICINE AND REHAB | Facility: CLINIC | Age: 53
End: 2018-10-29

## 2018-10-29 DIAGNOSIS — G89.29 CHRONIC MIDLINE LOW BACK PAIN WITHOUT SCIATICA: ICD-10-CM

## 2018-10-29 DIAGNOSIS — M54.50 CHRONIC MIDLINE LOW BACK PAIN WITHOUT SCIATICA: ICD-10-CM

## 2018-10-29 RX ORDER — VENLAFAXINE HYDROCHLORIDE 37.5 MG/1
37.5 CAPSULE, EXTENDED RELEASE ORAL DAILY
Qty: 30 CAPSULE | Refills: 3 | Status: SHIPPED | OUTPATIENT
Start: 2018-10-29 | End: 2019-02-11 | Stop reason: SDUPTHER

## 2018-11-14 RX ORDER — TIZANIDINE 4 MG/1
4 TABLET ORAL NIGHTLY PRN
Qty: 30 TABLET | Refills: 3 | Status: SHIPPED | OUTPATIENT
Start: 2018-11-14 | End: 2018-11-16 | Stop reason: SDUPTHER

## 2018-11-14 NOTE — TELEPHONE ENCOUNTER
10/24/18 last Rx refill  10/09/18 last office visit  02/11/19 RTC        ----- Message from Vasyl Patterson sent at 11/14/2018  9:45 AM CST -----  Rx Refill/Request     Is this a Refill or New Rx: Refill  Rx Name and Strength:  tiZANidine (ZANAFLEX) 4 MG tablet and HYDROcodone-acetaminophen (NORCO) 5-325 mg per tablet  Preferred Pharmacy with phone number: see below  Communication Preference: [phone]  Additional Information:     [facility]/pharmacy #[id number] - [facility] [address] [facility]  [address]  [facility] [address]  Phone: [phone] Fax: [phone]

## 2018-11-16 ENCOUNTER — TELEPHONE (OUTPATIENT)
Dept: PHYSICAL MEDICINE AND REHAB | Facility: CLINIC | Age: 53
End: 2018-11-16

## 2018-11-16 DIAGNOSIS — M54.50 CHRONIC MIDLINE LOW BACK PAIN WITHOUT SCIATICA: Primary | ICD-10-CM

## 2018-11-16 DIAGNOSIS — G89.29 CHRONIC MIDLINE LOW BACK PAIN WITHOUT SCIATICA: Primary | ICD-10-CM

## 2018-11-16 RX ORDER — HYDROCODONE BITARTRATE AND ACETAMINOPHEN 5; 325 MG/1; MG/1
1 TABLET ORAL 3 TIMES DAILY PRN
Qty: 90 TABLET | Refills: 0 | Status: SHIPPED | OUTPATIENT
Start: 2018-11-16 | End: 2018-12-12 | Stop reason: SDUPTHER

## 2018-11-16 RX ORDER — TIZANIDINE 4 MG/1
4 TABLET ORAL NIGHTLY PRN
Qty: 30 TABLET | Refills: 3 | Status: SHIPPED | OUTPATIENT
Start: 2018-11-16 | End: 2019-06-03 | Stop reason: SDUPTHER

## 2018-11-16 NOTE — TELEPHONE ENCOUNTER
Called and spoke with [facility].  Patient  Rx Zanaflex is due for  11/17/18, last pick from patient 10/16/18.  Hydrodcone Rx was not at the Pharmacy for  this month.        ----- Message from Vasyl Patterson sent at 11/16/2018 10:34 AM CST -----  Needs Advice    Reason for call: Pt states pharmacy below has not received script for tiZANidine (ZANAFLEX) 4 MG tablet and HYDROcodone-acetaminophen (NORCO) 5-325 mg per tablet        Communication Preference: [phone]    Additional Information:      [facility]/pharmacy #[id number] - [place] - [address] [facility]  [address]  [facility] [address]  Phone: [phone] Fax: [phone]

## 2018-11-20 DIAGNOSIS — M54.50 CHRONIC MIDLINE LOW BACK PAIN WITHOUT SCIATICA: ICD-10-CM

## 2018-11-20 DIAGNOSIS — G89.29 CHRONIC MIDLINE LOW BACK PAIN WITHOUT SCIATICA: ICD-10-CM

## 2018-11-20 RX ORDER — DICLOFENAC SODIUM 75 MG/1
TABLET, DELAYED RELEASE ORAL
Qty: 60 TABLET | Refills: 2 | Status: SHIPPED | OUTPATIENT
Start: 2018-11-20 | End: 2018-12-18 | Stop reason: SDUPTHER

## 2018-12-12 DIAGNOSIS — M54.50 CHRONIC MIDLINE LOW BACK PAIN WITHOUT SCIATICA: ICD-10-CM

## 2018-12-12 DIAGNOSIS — G89.29 CHRONIC MIDLINE LOW BACK PAIN WITHOUT SCIATICA: ICD-10-CM

## 2018-12-12 RX ORDER — HYDROCODONE BITARTRATE AND ACETAMINOPHEN 5; 325 MG/1; MG/1
1 TABLET ORAL 3 TIMES DAILY PRN
Qty: 90 TABLET | Refills: 0 | Status: SHIPPED | OUTPATIENT
Start: 2018-12-12 | End: 2019-01-15 | Stop reason: SDUPTHER

## 2018-12-12 NOTE — TELEPHONE ENCOUNTER
11/16/18 last Rx refill  10/09/18 last office visit  02/11/19 RTC    ----- Message from Vasyl Patterson sent at 12/12/2018 11:11 AM CST -----  Rx Refill/Request     Is this a Refill or New Rx: Refill  Rx Name and Strength: HYDROcodone-acetaminophen (NORCO) 5-325 mg per tablet    Preferred Pharmacy with phone number: see below  Communication Preference: [phone]  Additional Information:     [facility]/pharmacy #[id number] - [place], [place] - [address] [facility]  [address]  [facility] [address]  Phone: [phone] Fax: [phone]

## 2018-12-18 ENCOUNTER — TELEPHONE (OUTPATIENT)
Dept: INTERNAL MEDICINE | Facility: CLINIC | Age: 53
End: 2018-12-18

## 2018-12-18 ENCOUNTER — OFFICE VISIT (OUTPATIENT)
Dept: INTERNAL MEDICINE | Facility: CLINIC | Age: 53
End: 2018-12-18
Attending: INTERNAL MEDICINE
Payer: COMMERCIAL

## 2018-12-18 ENCOUNTER — IMMUNIZATION (OUTPATIENT)
Dept: PHARMACY | Facility: CLINIC | Age: 53
End: 2018-12-18
Payer: COMMERCIAL

## 2018-12-18 VITALS
BODY MASS INDEX: 22.22 KG/M2 | HEART RATE: 74 BPM | WEIGHT: 150 LBS | SYSTOLIC BLOOD PRESSURE: 120 MMHG | HEIGHT: 69 IN | OXYGEN SATURATION: 98 % | DIASTOLIC BLOOD PRESSURE: 88 MMHG

## 2018-12-18 DIAGNOSIS — E78.2 MIXED HYPERLIPIDEMIA: Primary | ICD-10-CM

## 2018-12-18 PROCEDURE — 3008F BODY MASS INDEX DOCD: CPT | Mod: CPTII,S$GLB,, | Performed by: INTERNAL MEDICINE

## 2018-12-18 PROCEDURE — 99999 PR PBB SHADOW E&M-EST. PATIENT-LVL IV: CPT | Mod: PBBFAC,,, | Performed by: INTERNAL MEDICINE

## 2018-12-18 PROCEDURE — 99214 OFFICE O/P EST MOD 30 MIN: CPT | Mod: S$GLB,,, | Performed by: INTERNAL MEDICINE

## 2018-12-18 RX ORDER — ATORVASTATIN CALCIUM 40 MG/1
40 TABLET, FILM COATED ORAL DAILY
Qty: 90 TABLET | Refills: 3 | Status: SHIPPED | OUTPATIENT
Start: 2018-12-18 | End: 2019-11-04 | Stop reason: SDUPTHER

## 2018-12-18 NOTE — PROGRESS NOTES
"Subjective:       Patient ID: Roxanne Templeton is a 53 y.o. female.    Chief Complaint: Follow-up (routine follow up.)    HPI   Roxanne Templeton is a 53 y.o. female here for follow up of:  She was last seen by me in 2015. She saw Magi Avila for annual June 2018.  She sees Dr. Hung for chronic LBP with lumbar DJD and radiculopathy on on diclofenac, venlafaxine, p.r.n. hydrocodone and p.r.n. tizanidine.  Quit smoking!    She reports she was told by allergist as a child she could not have any vaccines. Never had school vaccines or flu vaccine. She is not sure what the issue was.    Working on cutting down chocolate, red meat. More chicken.   Review of Systems   Constitutional: Negative for fever.   Respiratory: Negative for shortness of breath.    Cardiovascular: Negative for chest pain.   Musculoskeletal: Negative.    Skin: Negative.        Objective:   /88 (BP Location: Left arm, Patient Position: Sitting, BP Method: Medium (Manual))   Pulse 74   Ht 5' 9" (1.753 m)   Wt 68 kg (150 lb)   SpO2 98%   BMI 22.15 kg/m²      Physical Exam   Constitutional: She is oriented to person, place, and time. She appears well-developed and well-nourished.   HENT:   Head: Normocephalic and atraumatic.   Eyes: Conjunctivae and EOM are normal. Pupils are equal, round, and reactive to light.   Neck: Neck supple. No thyromegaly present.   Cardiovascular: Normal rate, regular rhythm and normal heart sounds.   No murmur heard.  Pulmonary/Chest: Effort normal and breath sounds normal. No respiratory distress. She has no wheezes.   Abdominal: Soft. Bowel sounds are normal. She exhibits no distension. There is no tenderness.   Musculoskeletal: Normal range of motion.   Neurological: She is alert and oriented to person, place, and time.   Skin: Skin is warm and dry. No rash noted.   Psychiatric: She has a normal mood and affect. Judgment and thought content normal.   Vitals reviewed.      Assessment:       1. Mixed " hyperlipidemia        Plan:       Roxanne was seen today for follow-up.    Diagnoses and all orders for this visit:    Mixed hyperlipidemia  -     Start atorvastatin (LIPITOR) 40 MG tablet; Take 1 tablet (40 mg total) by mouth once daily.  -     Labs in 3 mo:   Comprehensive metabolic panel; Future  -     Lipid panel; Future

## 2018-12-19 ENCOUNTER — TELEPHONE (OUTPATIENT)
Dept: INTERNAL MEDICINE | Facility: CLINIC | Age: 53
End: 2018-12-19

## 2018-12-19 NOTE — TELEPHONE ENCOUNTER
Please call pt. The labs she did today showed her cholesterol was even worse than 6 months ago. Start atorvastatin and repeat labs in 3 months.

## 2018-12-19 NOTE — TELEPHONE ENCOUNTER
----- Message from Natalia Cruz sent at 12/19/2018 11:06 AM CST -----  Contact: Patient [phone]      ----- Message -----  From: Ysabel Mejía  Sent: 12/19/2018  10:45 AM  To: Summer Severino Staff    Patient is returning a phone call.  Who left a message for the patient: Jennifer  Does patient know what this is regarding:  No

## 2018-12-28 ENCOUNTER — OFFICE VISIT (OUTPATIENT)
Dept: URGENT CARE | Facility: CLINIC | Age: 53
End: 2018-12-28
Payer: COMMERCIAL

## 2018-12-28 VITALS
OXYGEN SATURATION: 100 % | SYSTOLIC BLOOD PRESSURE: 112 MMHG | HEIGHT: 69 IN | WEIGHT: 150 LBS | DIASTOLIC BLOOD PRESSURE: 73 MMHG | TEMPERATURE: 98 F | BODY MASS INDEX: 22.22 KG/M2 | RESPIRATION RATE: 20 BRPM | HEART RATE: 74 BPM

## 2018-12-28 DIAGNOSIS — L98.491 SKIN ULCER, LIMITED TO BREAKDOWN OF SKIN: Primary | ICD-10-CM

## 2018-12-28 PROCEDURE — 3008F BODY MASS INDEX DOCD: CPT | Mod: CPTII,S$GLB,, | Performed by: PHYSICIAN ASSISTANT

## 2018-12-28 PROCEDURE — 99214 OFFICE O/P EST MOD 30 MIN: CPT | Mod: S$GLB,,, | Performed by: PHYSICIAN ASSISTANT

## 2018-12-28 RX ORDER — MUPIROCIN 20 MG/G
OINTMENT TOPICAL
Qty: 22 G | Refills: 1 | Status: SHIPPED | OUTPATIENT
Start: 2018-12-28 | End: 2019-06-18

## 2018-12-28 NOTE — PATIENT INSTRUCTIONS
- Rest.    - Drink plenty of fluids.    - Tylenol or Ibuprofen as directed as needed for fever/pain.    - Follow up with your PCP or specialty clinic as directed in the next 1-2 weeks if not improved or as needed.  You can call [phone] to schedule an appointment with the appropriate provider.    - Go to the ED if your symptoms worsen.  - You must understand that you have received an Urgent Care treatment only and that you may be released before all of your medical problems are known or treated.   - You, the patient, will arrange for follow up care as instructed.   - If your condition worsens or fails to improve we recommend that you receive another evaluation at the ER immediately or contact your PCP to discuss your concerns or return here.       Simple Skin Ulcer  A skin ulcer is a sore on the skin. Skin ulcers often form when blood circulation is impaired. Being bed- or wheelchair-bound can cause pressure that may lead to skin ulcers. Ulcers are generally round areas of red, swollen, thickened skin around a crater-like depression. They are often very slow to heal. If a skin ulcer isn't properly treated, it may become infected. If the infection spreads, it can cause serious health issues.    Symptoms of a skin ulcer include:  · Reddish area on the skin  · Skin color and texture changes  · Swelling  · Wound that isn't healing  · Crater in the skin  · Pain  · Drainage or pus  Causes  There are many causes of skin ulcers. Some of these include:  · Decreased blood flow to a part of the skin, vascular insufficiency  · Trauma  · Lack of movement of a part of the body for long periods of time  · Infection  · Poor hygiene  · Varicose veins  · Vitamin deficiency  Pressure ulcers  Pressure ulcers are a type of ulcer most commonly seen in people who are confined to bed or a wheelchair. They are caused by prolonged pressure to a spot on the skin. Pressure ulcers usually occur on the back, buttocks, or backs or sides of  the legs, arms, or feet (especially the heels).  Home care  You may be prescribed antibiotics to prevent infection. If this is the case, be sure to take all of the medicine, even if your symptoms get better. You may also be given medicines to help relieve pain. Follow the healthcare providers instructions when using these medicines.  General care  · Care for the skin ulcer as instructed. Always wash your hands with soap and warm water before and after caring for your wound.  · Cover the ulcer with a clean, dry bandage. Remove and change the bandage as instructed. If the bandage becomes wet or dirty, change it as soon as possible.  · Follow the doctors instructions about washing. You can shower, but do not soak the healing ulcer until the doctor says its OK.  · Do not scratch, rub, or pick at the healing skin.  · Check the area every day for signs of infection, such as increasing pain, redness, warmth, red streaking, swelling, or pus draining from the ulcer.  · When resting, raise the area where the ulcer is above the level of the heart.  · Avoid smoking or drinking alcohol, as these can delay wound healing.  · If you are able, try to walk regularly. This can help with circulation.  · Avoid prolonged standing or sitting in one position.  The following tips can help prevent future skin ulcers:  · Know your risks for skin ulcers.  · Keep the skin clean and dry.  · Reposition frequently.  · Use protective devices such as pillows, foam wedges, and heel protectors for the knees, ankles, and heels.  · Avoid immobilization.  Follow-up care  Follow up with your healthcare provider, or as advised.  When to seek medical advice  Call your healthcare provider right away if any of these occur:  · Fever of 100.4°F (38°C) or higher, or as advised by your healthcare provider  · Signs of infection. These include increasing pain, warmth, redness, or pus draining from the skin ulcer.  · Bleeding from the skin ulcer  · Pain in or  around the ulcer that doesn't get better even with medicines  · Increased swelling  · Changes in skin color  Date Last Reviewed: 9/1/2016  © 2825-4230 [employer]. [address] [place] [address], [place] [address]. All rights reserved. This information is not intended as a substitute for professional medical care. Always follow your healthcare professional's instructions.        Wound Care  Taking proper care of your wound will help it heal. Your healthcare provider may show you how to clean and dress the wound. He or she will also explain how to tell if the wound is healing normally. If you are unsure of how to take care of the wound, be sure to clarify what dressing to use and how often you should change the bandages. Here are the basic steps.     A wound that's not healing normally may be dark in color or have white streaks.   Wash your hands  Tips for washing your hands include:  · Use liquid soap and lather for 2 minutes. Scrub between your fingers and under your nails.  · Rinse with warm water, keeping your fingers pointing down.  · Use a paper towel to dry your hands and to turn off the faucet.  Remove the used dressing  Here are suggestions for removing the dressing:  · If dressing changes cause you pain, be sure to take your pain medicine as prescribed by your healthcare provider 30 minutes before dressing changes.  · Set up your supplies.  · Put on disposable gloves if youre dressing a wound for someone else or your wound is infected.  · Loosen the tape by pulling gently toward the wound.  · Gently take off the old dressing. If the dressing is stuck to the wound, moisten it with saline (if available) or clean water.  · If you have a drain or tube in the wound, be careful not to pull on it.  · Remove the dressing 1 layer at a time and put it in a plastic bag.  · Remove your gloves.  Inspect and dress the wound  Check the wound carefully:  · Each time you change the dressing, inspect the wound  carefully to be sure its healing normally by making sure your wound appears to be pink and moist, and is free of infection.    · Wash your hands again. Put on a new pair of gloves.  · Clean and dress the wound as directed by your healthcare provider or nurse. Do not put anything in the wound that is not prescribed or directed by your healthcare provider. If you have a drain or tube, be careful not to pull on it. Make sure to secure the drain or tube as well.  · Put all supplies in a plastic bag. Seal the bag and put it in the trash.  · Be sure to wash your hands again.  Call your healthcare provider  Call your healthcare provider if you see any of the following signs of a problem:  · Bleeding that soaks the dressing  · Pink fluid weeping from the wound  · Increased drainage or drainage that is yellow, yellow-green, or foul-smelling  · Increased swelling or pain, or redness or swelling in the skin around the wound  · A change in the color of the wound, or if streaks develop in a direction away from the wound  · The area between any stitches opens up  · An increase in the size of the wound  · A fever of 100.4°F (38°C) or higher, or as directed by your healthcare provider  · Chills, increased fatigue, or a loss of appetite      Date Last Reviewed: 7/30/2015  © 8123-4063 [employer]. [address] [place] [address], [place] [address]. All rights reserved. This information is not intended as a substitute for professional medical care. Always follow your healthcare professional's instructions.

## 2018-12-28 NOTE — PROGRESS NOTES
"Subjective:       Patient ID: Roxanne Templeton is a 53 y.o. female.    Vitals:  height is 5' 9" (1.753 m) and weight is 68 kg (150 lb). Her oral temperature is 97.5 °F (36.4 °C). Her blood pressure is 112/73 and her pulse is 74. Her respiration is 20 and oxygen saturation is 100%.     Chief Complaint: Insect Bite    This is a 53 y.o. female who presents today with a chief complaint of possible bug bite to the right upper arm.  Patient states she doesn't know what possibly bit her.  She has a small area that is burning and itching.  She states the muscle hurts, itches, and burns.  Pt denies draining.  She states the area is getting bigger.   Pt has been using peroxide and OTC ABX ointment which has not helped.       Insect Bite   This is a new problem. The current episode started 1 to 4 weeks ago (Thursday, Dec 20). The problem occurs constantly. The problem has been gradually worsening. Associated symptoms include a rash. Pertinent negatives include no arthralgias, chills, coughing, fever, joint swelling or sore throat. Nothing aggravates the symptoms. Treatments tried: Perioxide, OTC ABX Cream. The treatment provided no relief.       Constitution: Negative for chills and fever.   HENT: Negative for facial swelling and sore throat.    Neck: Negative for painful lymph nodes.   Eyes: Negative for eye itching and eyelid swelling.   Respiratory: Negative for cough.    Musculoskeletal: Negative for joint pain and joint swelling.   Skin: Positive for rash. Negative for color change, pale, wound, abrasion, laceration, lesion, skin thickening/induration, puncture wound, bruising, abscess, avulsion and hives.   Allergic/Immunologic: Negative for environmental allergies, immunocompromised state and hives.   Hematologic/Lymphatic: Negative for swollen lymph nodes.       Objective:      Physical Exam   Constitutional: She is oriented to person, place, and time. She appears well-developed and well-nourished.   HENT:   Head: " Normocephalic and atraumatic.   Eyes: Conjunctivae are normal.   Neck: Normal range of motion. Neck supple. No thyromegaly present.   Cardiovascular: Normal rate and regular rhythm. Exam reveals no gallop and no friction rub.   No murmur heard.  Pulmonary/Chest: Effort normal and breath sounds normal. She has no wheezes. She has no rales.   Musculoskeletal: Normal range of motion.   Lymphadenopathy:     She has no cervical adenopathy.   Neurological: She is alert and oriented to person, place, and time.   Skin: Skin is warm and dry. No rash noted.        Psychiatric: She has a normal mood and affect. Her behavior is normal. Judgment and thought content normal.   Nursing note and vitals reviewed.      Assessment:       1. Skin ulcer, limited to breakdown of skin        Plan:         Skin ulcer, limited to breakdown of skin  -     mupirocin (BACTROBAN) 2 % ointment; Apply to affected area 3 times daily  Dispense: 22 g; Refill: 1  -     Ambulatory referral to Dermatology        Roxanne was seen today for insect bite.    Diagnoses and all orders for this visit:    Skin ulcer, limited to breakdown of skin  -     mupirocin (BACTROBAN) 2 % ointment; Apply to affected area 3 times daily  -     Ambulatory referral to Dermatology      Patient Instructions     - Rest.    - Drink plenty of fluids.    - Tylenol or Ibuprofen as directed as needed for fever/pain.    - Follow up with your PCP or specialty clinic as directed in the next 1-2 weeks if not improved or as needed.  You can call [phone] to schedule an appointment with the appropriate provider.    - Go to the ED if your symptoms worsen.  - You must understand that you have received an Urgent Care treatment only and that you may be released before all of your medical problems are known or treated.   - You, the patient, will arrange for follow up care as instructed.   - If your condition worsens or fails to improve we recommend that you receive another evaluation at  the ER immediately or contact your PCP to discuss your concerns or return here.       Simple Skin Ulcer  A skin ulcer is a sore on the skin. Skin ulcers often form when blood circulation is impaired. Being bed- or wheelchair-bound can cause pressure that may lead to skin ulcers. Ulcers are generally round areas of red, swollen, thickened skin around a crater-like depression. They are often very slow to heal. If a skin ulcer isn't properly treated, it may become infected. If the infection spreads, it can cause serious health issues.    Symptoms of a skin ulcer include:  · Reddish area on the skin  · Skin color and texture changes  · Swelling  · Wound that isn't healing  · Crater in the skin  · Pain  · Drainage or pus  Causes  There are many causes of skin ulcers. Some of these include:  · Decreased blood flow to a part of the skin, vascular insufficiency  · Trauma  · Lack of movement of a part of the body for long periods of time  · Infection  · Poor hygiene  · Varicose veins  · Vitamin deficiency  Pressure ulcers  Pressure ulcers are a type of ulcer most commonly seen in people who are confined to bed or a wheelchair. They are caused by prolonged pressure to a spot on the skin. Pressure ulcers usually occur on the back, buttocks, or backs or sides of the legs, arms, or feet (especially the heels).  Home care  You may be prescribed antibiotics to prevent infection. If this is the case, be sure to take all of the medicine, even if your symptoms get better. You may also be given medicines to help relieve pain. Follow the healthcare providers instructions when using these medicines.  General care  · Care for the skin ulcer as instructed. Always wash your hands with soap and warm water before and after caring for your wound.  · Cover the ulcer with a clean, dry bandage. Remove and change the bandage as instructed. If the bandage becomes wet or dirty, change it as soon as possible.  · Follow the doctors instructions  about washing. You can shower, but do not soak the healing ulcer until the doctor says its OK.  · Do not scratch, rub, or pick at the healing skin.  · Check the area every day for signs of infection, such as increasing pain, redness, warmth, red streaking, swelling, or pus draining from the ulcer.  · When resting, raise the area where the ulcer is above the level of the heart.  · Avoid smoking or drinking alcohol, as these can delay wound healing.  · If you are able, try to walk regularly. This can help with circulation.  · Avoid prolonged standing or sitting in one position.  The following tips can help prevent future skin ulcers:  · Know your risks for skin ulcers.  · Keep the skin clean and dry.  · Reposition frequently.  · Use protective devices such as pillows, foam wedges, and heel protectors for the knees, ankles, and heels.  · Avoid immobilization.  Follow-up care  Follow up with your healthcare provider, or as advised.  When to seek medical advice  Call your healthcare provider right away if any of these occur:  · Fever of 100.4°F (38°C) or higher, or as advised by your healthcare provider  · Signs of infection. These include increasing pain, warmth, redness, or pus draining from the skin ulcer.  · Bleeding from the skin ulcer  · Pain in or around the ulcer that doesn't get better even with medicines  · Increased swelling  · Changes in skin color  Date Last Reviewed: 9/1/2016  © 3228-8757 [employer]. [address] [place] [address], [place] [address]. All rights reserved. This information is not intended as a substitute for professional medical care. Always follow your healthcare professional's instructions.        Wound Care  Taking proper care of your wound will help it heal. Your healthcare provider may show you how to clean and dress the wound. He or she will also explain how to tell if the wound is healing normally. If you are unsure of how to take care of the wound, be sure to clarify what  dressing to use and how often you should change the bandages. Here are the basic steps.     A wound that's not healing normally may be dark in color or have white streaks.   Wash your hands  Tips for washing your hands include:  · Use liquid soap and lather for 2 minutes. Scrub between your fingers and under your nails.  · Rinse with warm water, keeping your fingers pointing down.  · Use a paper towel to dry your hands and to turn off the faucet.  Remove the used dressing  Here are suggestions for removing the dressing:  · If dressing changes cause you pain, be sure to take your pain medicine as prescribed by your healthcare provider 30 minutes before dressing changes.  · Set up your supplies.  · Put on disposable gloves if youre dressing a wound for someone else or your wound is infected.  · Loosen the tape by pulling gently toward the wound.  · Gently take off the old dressing. If the dressing is stuck to the wound, moisten it with saline (if available) or clean water.  · If you have a drain or tube in the wound, be careful not to pull on it.  · Remove the dressing 1 layer at a time and put it in a plastic bag.  · Remove your gloves.  Inspect and dress the wound  Check the wound carefully:  · Each time you change the dressing, inspect the wound carefully to be sure its healing normally by making sure your wound appears to be pink and moist, and is free of infection.    · Wash your hands again. Put on a new pair of gloves.  · Clean and dress the wound as directed by your healthcare provider or nurse. Do not put anything in the wound that is not prescribed or directed by your healthcare provider. If you have a drain or tube, be careful not to pull on it. Make sure to secure the drain or tube as well.  · Put all supplies in a plastic bag. Seal the bag and put it in the trash.  · Be sure to wash your hands again.  Call your healthcare provider  Call your healthcare provider if you see any of the following signs of a  problem:  · Bleeding that soaks the dressing  · Pink fluid weeping from the wound  · Increased drainage or drainage that is yellow, yellow-green, or foul-smelling  · Increased swelling or pain, or redness or swelling in the skin around the wound  · A change in the color of the wound, or if streaks develop in a direction away from the wound  · The area between any stitches opens up  · An increase in the size of the wound  · A fever of 100.4°F (38°C) or higher, or as directed by your healthcare provider  · Chills, increased fatigue, or a loss of appetite      Date Last Reviewed: 7/30/2015  © 0175-9925 [employer]. [address] [place] [address], [place] [address]. All rights reserved. This information is not intended as a substitute for professional medical care. Always follow your healthcare professional's instructions.

## 2019-01-15 DIAGNOSIS — G89.29 CHRONIC MIDLINE LOW BACK PAIN WITHOUT SCIATICA: ICD-10-CM

## 2019-01-15 DIAGNOSIS — M54.50 CHRONIC MIDLINE LOW BACK PAIN WITHOUT SCIATICA: ICD-10-CM

## 2019-01-15 RX ORDER — HYDROCODONE BITARTRATE AND ACETAMINOPHEN 5; 325 MG/1; MG/1
1 TABLET ORAL 3 TIMES DAILY PRN
Qty: 90 TABLET | Refills: 0 | Status: SHIPPED | OUTPATIENT
Start: 2019-01-16 | End: 2019-02-11 | Stop reason: DRUGHIGH

## 2019-01-15 NOTE — TELEPHONE ENCOUNTER
12/12/18 last Rx refill  02/11/19 RTC  10/09/18 last office visit       ----- Message from Alejandra Robertson sent at 1/15/2019  9:32 AM CST -----  Rx Refill/Request     Is this a Refill or New Rx:  refill  Rx Name and Strength:  HYDROcodone-acetaminophen (NORCO) 5-325 mg per tablet  Preferred Pharmacy with phone number:     [facility]/pharmacy #[id number] - [place] - [address] [place] [id number]  [facility] [address]  Phone: [phone] Fax: [phone]    Communication Preference:pt @ [phone]  Additional Information:

## 2019-02-11 ENCOUNTER — LAB VISIT (OUTPATIENT)
Dept: LAB | Facility: HOSPITAL | Age: 54
End: 2019-02-11
Attending: PHYSICAL MEDICINE & REHABILITATION
Payer: COMMERCIAL

## 2019-02-11 ENCOUNTER — OFFICE VISIT (OUTPATIENT)
Dept: PHYSICAL MEDICINE AND REHAB | Facility: CLINIC | Age: 54
End: 2019-02-11
Payer: COMMERCIAL

## 2019-02-11 VITALS
DIASTOLIC BLOOD PRESSURE: 69 MMHG | HEART RATE: 76 BPM | SYSTOLIC BLOOD PRESSURE: 109 MMHG | BODY MASS INDEX: 21.72 KG/M2 | WEIGHT: 146.63 LBS | HEIGHT: 69 IN

## 2019-02-11 DIAGNOSIS — M54.42 CHRONIC MIDLINE LOW BACK PAIN WITH BILATERAL SCIATICA: Primary | ICD-10-CM

## 2019-02-11 DIAGNOSIS — Z79.891 CHRONIC USE OF OPIATE FOR THERAPEUTIC PURPOSE: ICD-10-CM

## 2019-02-11 DIAGNOSIS — M54.50 CHRONIC MIDLINE LOW BACK PAIN WITHOUT SCIATICA: ICD-10-CM

## 2019-02-11 DIAGNOSIS — M51.36 DDD (DEGENERATIVE DISC DISEASE), LUMBAR: ICD-10-CM

## 2019-02-11 DIAGNOSIS — M54.41 CHRONIC MIDLINE LOW BACK PAIN WITH BILATERAL SCIATICA: Primary | ICD-10-CM

## 2019-02-11 DIAGNOSIS — G89.29 CHRONIC MIDLINE LOW BACK PAIN WITH BILATERAL SCIATICA: Primary | ICD-10-CM

## 2019-02-11 DIAGNOSIS — G89.29 CHRONIC MIDLINE LOW BACK PAIN WITHOUT SCIATICA: ICD-10-CM

## 2019-02-11 PROCEDURE — 99214 OFFICE O/P EST MOD 30 MIN: CPT | Mod: S$GLB,,, | Performed by: PHYSICAL MEDICINE & REHABILITATION

## 2019-02-11 PROCEDURE — 99214 PR OFFICE/OUTPT VISIT, EST, LEVL IV, 30-39 MIN: ICD-10-PCS | Mod: S$GLB,,, | Performed by: PHYSICAL MEDICINE & REHABILITATION

## 2019-02-11 PROCEDURE — 3008F PR BODY MASS INDEX (BMI) DOCUMENTED: ICD-10-PCS | Mod: CPTII,S$GLB,, | Performed by: PHYSICAL MEDICINE & REHABILITATION

## 2019-02-11 PROCEDURE — 99999 PR PBB SHADOW E&M-EST. PATIENT-LVL III: CPT | Mod: PBBFAC,,, | Performed by: PHYSICAL MEDICINE & REHABILITATION

## 2019-02-11 PROCEDURE — 80307 DRUG TEST PRSMV CHEM ANLYZR: CPT

## 2019-02-11 PROCEDURE — 99999 PR PBB SHADOW E&M-EST. PATIENT-LVL III: ICD-10-PCS | Mod: PBBFAC,,, | Performed by: PHYSICAL MEDICINE & REHABILITATION

## 2019-02-11 PROCEDURE — 3008F BODY MASS INDEX DOCD: CPT | Mod: CPTII,S$GLB,, | Performed by: PHYSICAL MEDICINE & REHABILITATION

## 2019-02-11 RX ORDER — HYDROCODONE BITARTRATE AND ACETAMINOPHEN 7.5; 325 MG/1; MG/1
1 TABLET ORAL 3 TIMES DAILY PRN
Qty: 90 TABLET | Refills: 0 | Status: SHIPPED | OUTPATIENT
Start: 2019-02-11 | End: 2019-03-08 | Stop reason: SDUPTHER

## 2019-02-11 RX ORDER — VENLAFAXINE HYDROCHLORIDE 150 MG/1
150 CAPSULE, EXTENDED RELEASE ORAL DAILY
Qty: 30 CAPSULE | Refills: 3 | Status: SHIPPED | OUTPATIENT
Start: 2019-02-11 | End: 2019-06-11 | Stop reason: SDUPTHER

## 2019-02-11 RX ORDER — DICLOFENAC SODIUM 75 MG/1
75 TABLET, DELAYED RELEASE ORAL 2 TIMES DAILY
Qty: 60 TABLET | Refills: 3 | Status: SHIPPED | OUTPATIENT
Start: 2019-02-11 | End: 2019-06-03 | Stop reason: SDUPTHER

## 2019-02-11 RX ORDER — VENLAFAXINE HYDROCHLORIDE 37.5 MG/1
37.5 CAPSULE, EXTENDED RELEASE ORAL DAILY
Qty: 30 CAPSULE | Refills: 3 | Status: SHIPPED | OUTPATIENT
Start: 2019-02-11 | End: 2019-06-11 | Stop reason: SDUPTHER

## 2019-02-11 NOTE — PROGRESS NOTES
Subjective:       Patient ID: Roxanne Templeton is a 54 y.o. female.    Chief Complaint: No chief complaint on file.    HPI    HISTORY OF PRESENT ILLNESS:  Ms. Templeton is a 54-year-old white female who is   followed up in the Physical Medicine Clinic for chronic low back pain with   history of lumbar radiculopathy.  Her last visit to the clinic was on   10/09/2018.  She was maintained on venlafaxine, diclofenac, p.r.n.   hydrocodone/APAP and p.r.n. tizanidine.    The patient is coming today to the clinic for followup.  Her low back pain has   gotten worse.  She denies any preceding trauma.  Her pain is a constant aching   sensation in the lumbar spine and across her back.  She started to have   occasional shooting pain to the right foot numbness.  She more recently is   having pain in the right foot.  Her pain is worse with activity.  Her maximum   pain is 9-10/10 and minimum 3-4/10.  Today, it is 5-6/10.  The patient complains   of mild bilateral lower extremity weakness.  She reports near falls, but no   falling.  She denies any bowel or bladder incontinence.    She is currently taking venlafaxine  mg capsules plus 37.5 mg capsule.    She takes diclofenac 75 mg p.o. twice per day.  She takes hydrocodone/APAP 5/325   p.r.n., usually three times per day.  She also takes Tylenol 500 mg p.r.n.,   usually four to five tablets daily.  She uses tizanidine once or twice per day   for help with muscle spasms.  She is trying to exercise.      MS/HN  dd: 02/11/2019 09:24:04 (CST)  td: 02/12/2019 04:58:38 (CST)  Doc ID   #[id number]  Job ID #[id number]    CC:           Review of Systems   Constitutional: Negative for fatigue.   Eyes: Negative for visual disturbance.   Respiratory: Negative for shortness of breath.    Cardiovascular: Negative for chest pain.   Gastrointestinal: Negative for blood in stool, constipation, nausea and vomiting.   Genitourinary: Negative for difficulty urinating.   Musculoskeletal: Positive for  back pain. Negative for arthralgias, gait problem and neck pain.   Neurological: Positive for dizziness. Negative for headaches.   Psychiatric/Behavioral: Positive for sleep disturbance. Negative for behavioral problems.       Objective:      Physical Exam   Constitutional: She appears well-developed and well-nourished. No distress.   Neck: Normal range of motion.   Musculoskeletal:   BLE:  ROM:   RLE: full.   LLE: full.  Knee crepitus:   RLE: mild.   LLE: mild.   Strength:    RLE: 4/5 at hip flexion, 5 knee extension, 5 ankle DF, 5 PF.   LLE: 4/5 at hip flexion, 5 knee extension, 5 ankle DF, 5 PF.  Sensation to pinprick:     RLE: decreased.      LLE: decreased.   DTR:     RLE: +3 knee, +1 ankle.    LLE: +3 knee, +1 ankle.  Clonus:    Rt ankle: -ve.    Lt ankle: -ve.  SLR (sitting):      RLE: +ve.      LLE: +ve.    Mild tenderness over lumbar spine.    Heel walking: WNL.  Toe walking: WNL.  Gait: WNL       Neurological: She is alert.   Psychiatric: She has a normal mood and affect. Her behavior is normal.   Vitals reviewed.            Assessment:       1. Chronic midline low back pain with bilateral sciatica    2. DDD (degenerative disc disease), lumbar    3. Chronic use of opiate for therapeutic purpose        Plan:     -  MRI Lumbar Spine Without Contrast; Future  -  Continue diclofenac (VOLTAREN) 75 MG EC tablet; Take 1 tablet (75 mg total) by mouth 2 (two) times daily.  - Increase venlafaxine (EFFEXOR-XR) 150 MG Cp24 and two 37.5 MG 24 hr capsules; Take 1 capsule of each by mouth once daily.  - Increase  HYDROcodone-acetaminophen (NORCO) 7.5-325 mg per tablet; Take 1 tablet by mouth 3 (three) times daily as needed for Pain.  - Continue tiZANidine (ZANAFLEX) 4 MG tablet; Take 1 tablet (4 mg total) by mouth nightly as needed (muscle spasms).  - Regular home exercise program was encouraged.  - Follow-up in about 3 months (around 5/11/2019).     This was a 25 minute visit, more than 50% of which was spent counseling  the patient about the diagnosis and the treatment plan.

## 2019-02-15 LAB

## 2019-02-19 ENCOUNTER — TELEPHONE (OUTPATIENT)
Dept: PHYSICAL MEDICINE AND REHAB | Facility: CLINIC | Age: 54
End: 2019-02-19

## 2019-02-19 NOTE — TELEPHONE ENCOUNTER
----- Message from Fiona Gonzales sent at 2/19/2019  9:51 AM CST -----  Contact: self @ [phone]  Pt would like to speak with Dr Hung about her back and her leg pain and he financial situation.  Pls call.

## 2019-02-19 NOTE — TELEPHONE ENCOUNTER
Called pharmacy to ask if a PA was needed for the pt's medications, no PAs needed, pt has already picked up her meds.  Returned call to pt.  Pt wanted to let us know that she had to postpone her MRI due to budget concerns, has to pay for her son's hospital visit.  Her back is the same, the Norco is helping.  Her leg pain, she has weakness and numbness in the early morning starting a few years ago.  She has gotten bilateral knee braces, a back brace and an ankle brace that she wears.  Pt stated she takes tylenol 500mg 4-5 times a day as well as the Norco, advised the pt switch from tylenol to ibuprofen or aleve as Norco has tylenol in it and she does not want to damage her liver.

## 2019-03-08 DIAGNOSIS — G89.29 CHRONIC MIDLINE LOW BACK PAIN WITH BILATERAL SCIATICA: ICD-10-CM

## 2019-03-08 DIAGNOSIS — M54.41 CHRONIC MIDLINE LOW BACK PAIN WITH BILATERAL SCIATICA: ICD-10-CM

## 2019-03-08 DIAGNOSIS — M54.42 CHRONIC MIDLINE LOW BACK PAIN WITH BILATERAL SCIATICA: ICD-10-CM

## 2019-03-08 RX ORDER — HYDROCODONE BITARTRATE AND ACETAMINOPHEN 7.5; 325 MG/1; MG/1
1 TABLET ORAL 3 TIMES DAILY PRN
Qty: 90 TABLET | Refills: 0 | Status: SHIPPED | OUTPATIENT
Start: 2019-03-13 | End: 2019-04-12 | Stop reason: SDUPTHER

## 2019-03-08 NOTE — TELEPHONE ENCOUNTER
02/11/19 last RX refill  02/11/19 last office visit  06/03/19 RTC    ----- Message from Dhiraj Alejandro sent at 3/8/2019  1:29 PM CST -----  Contact: Self/ [phone]  Rx Refill/Request     Is this a Refill or New Rx: Refill.    Rx Name and Strength:  HYDROcodone-acetaminophen (NORCO) 7.5-325 mg.   Preferred Pharmacy with phone number: [facility]/pharmacy #[id number] phone number [phone].   Communication Preference: [phone].

## 2019-03-18 ENCOUNTER — LAB VISIT (OUTPATIENT)
Dept: LAB | Facility: HOSPITAL | Age: 54
End: 2019-03-18
Attending: INTERNAL MEDICINE
Payer: COMMERCIAL

## 2019-03-18 DIAGNOSIS — E78.2 MIXED HYPERLIPIDEMIA: ICD-10-CM

## 2019-03-18 LAB
ALBUMIN SERPL BCP-MCNC: 4 G/DL
ALP SERPL-CCNC: 53 U/L
ALT SERPL W/O P-5'-P-CCNC: 15 U/L
ANION GAP SERPL CALC-SCNC: 7 MMOL/L
AST SERPL-CCNC: 22 U/L
BILIRUB SERPL-MCNC: 0.4 MG/DL
BUN SERPL-MCNC: 9 MG/DL
CALCIUM SERPL-MCNC: 9.8 MG/DL
CHLORIDE SERPL-SCNC: 107 MMOL/L
CHOLEST SERPL-MCNC: 161 MG/DL
CHOLEST/HDLC SERPL: 4.4 {RATIO}
CO2 SERPL-SCNC: 28 MMOL/L
CREAT SERPL-MCNC: 0.9 MG/DL
EST. GFR  (AFRICAN AMERICAN): >60 ML/MIN/1.73 M^2
EST. GFR  (NON AFRICAN AMERICAN): >60 ML/MIN/1.73 M^2
GLUCOSE SERPL-MCNC: 88 MG/DL
HDLC SERPL-MCNC: 37 MG/DL
HDLC SERPL: 23 %
LDLC SERPL CALC-MCNC: 98 MG/DL
NONHDLC SERPL-MCNC: 124 MG/DL
POTASSIUM SERPL-SCNC: 4.7 MMOL/L
PROT SERPL-MCNC: 7.1 G/DL
SODIUM SERPL-SCNC: 142 MMOL/L
TRIGL SERPL-MCNC: 130 MG/DL

## 2019-03-18 PROCEDURE — 80061 LIPID PANEL: CPT

## 2019-03-18 PROCEDURE — 36415 COLL VENOUS BLD VENIPUNCTURE: CPT

## 2019-03-18 PROCEDURE — 80053 COMPREHEN METABOLIC PANEL: CPT

## 2019-03-19 ENCOUNTER — TELEPHONE (OUTPATIENT)
Dept: PRIMARY CARE CLINIC | Facility: CLINIC | Age: 54
End: 2019-03-19

## 2019-03-20 ENCOUNTER — TELEPHONE (OUTPATIENT)
Dept: INTERNAL MEDICINE | Facility: CLINIC | Age: 54
End: 2019-03-20

## 2019-03-20 NOTE — TELEPHONE ENCOUNTER
Spoke with patient. Patient asks if it is necessary that she comes in for her visit on 03/25. Patient states that she does not have any questions or concerns. Did you need to see her for hypertension, diabetes or anything? Informed patient that after she had her labs drawn, she was supposed to come see you after but did not.     Please advise.

## 2019-04-12 DIAGNOSIS — G89.29 CHRONIC MIDLINE LOW BACK PAIN WITH BILATERAL SCIATICA: ICD-10-CM

## 2019-04-12 DIAGNOSIS — M54.41 CHRONIC MIDLINE LOW BACK PAIN WITH BILATERAL SCIATICA: ICD-10-CM

## 2019-04-12 DIAGNOSIS — M54.42 CHRONIC MIDLINE LOW BACK PAIN WITH BILATERAL SCIATICA: ICD-10-CM

## 2019-04-12 RX ORDER — HYDROCODONE BITARTRATE AND ACETAMINOPHEN 7.5; 325 MG/1; MG/1
1 TABLET ORAL 3 TIMES DAILY PRN
Qty: 90 TABLET | Refills: 0 | Status: SHIPPED | OUTPATIENT
Start: 2019-04-12 | End: 2019-05-10 | Stop reason: SDUPTHER

## 2019-04-12 NOTE — TELEPHONE ENCOUNTER
03/08/19 last Rx refill  02/11/19 last office visit  06/03/19 RTC    ----- Message from Vasyl Patterson sent at 4/12/2019  8:33 AM CDT -----  Rx Refill/Request     Is this a Refill or New Rx: Refill   Rx Name and Strength: HYDROcodone-acetaminophen (NORCO) 7.5-325 mg per tablet    Preferred Pharmacy with phone number: see below  Communication Preference: [phone]  Additional Information:     [facility]/pharmacy #[id number] - [place] - [address] [facility]  [address]  [facility] [address]  Phone: [phone] Fax: [phone]

## 2019-04-15 ENCOUNTER — TELEPHONE (OUTPATIENT)
Dept: INTERNAL MEDICINE | Facility: CLINIC | Age: 54
End: 2019-04-15

## 2019-04-15 NOTE — TELEPHONE ENCOUNTER
----- Message from Stewart Lopez sent at 4/15/2019 12:32 PM CDT -----  Contact: patient  6/18/19 Annual Physical need lab orders placed and linked  Thank you

## 2019-05-10 DIAGNOSIS — M54.42 CHRONIC MIDLINE LOW BACK PAIN WITH BILATERAL SCIATICA: ICD-10-CM

## 2019-05-10 DIAGNOSIS — M54.41 CHRONIC MIDLINE LOW BACK PAIN WITH BILATERAL SCIATICA: ICD-10-CM

## 2019-05-10 DIAGNOSIS — G89.29 CHRONIC MIDLINE LOW BACK PAIN WITH BILATERAL SCIATICA: ICD-10-CM

## 2019-05-10 RX ORDER — HYDROCODONE BITARTRATE AND ACETAMINOPHEN 7.5; 325 MG/1; MG/1
1 TABLET ORAL 3 TIMES DAILY PRN
Qty: 90 TABLET | Refills: 0 | Status: SHIPPED | OUTPATIENT
Start: 2019-05-12 | End: 2019-06-03

## 2019-06-03 ENCOUNTER — LAB VISIT (OUTPATIENT)
Dept: LAB | Facility: HOSPITAL | Age: 54
End: 2019-06-03
Attending: PHYSICAL MEDICINE & REHABILITATION
Payer: COMMERCIAL

## 2019-06-03 ENCOUNTER — OFFICE VISIT (OUTPATIENT)
Dept: PHYSICAL MEDICINE AND REHAB | Facility: CLINIC | Age: 54
End: 2019-06-03
Payer: COMMERCIAL

## 2019-06-03 VITALS
WEIGHT: 149.06 LBS | HEART RATE: 94 BPM | DIASTOLIC BLOOD PRESSURE: 68 MMHG | BODY MASS INDEX: 22.08 KG/M2 | SYSTOLIC BLOOD PRESSURE: 111 MMHG | HEIGHT: 69 IN

## 2019-06-03 DIAGNOSIS — M51.36 DDD (DEGENERATIVE DISC DISEASE), LUMBAR: ICD-10-CM

## 2019-06-03 DIAGNOSIS — M54.41 CHRONIC MIDLINE LOW BACK PAIN WITH BILATERAL SCIATICA: Primary | ICD-10-CM

## 2019-06-03 DIAGNOSIS — R20.0 BILATERAL HAND NUMBNESS: ICD-10-CM

## 2019-06-03 DIAGNOSIS — Z79.891 CHRONIC USE OF OPIATE FOR THERAPEUTIC PURPOSE: ICD-10-CM

## 2019-06-03 DIAGNOSIS — M54.42 CHRONIC MIDLINE LOW BACK PAIN WITH BILATERAL SCIATICA: Primary | ICD-10-CM

## 2019-06-03 DIAGNOSIS — G89.29 CHRONIC MIDLINE LOW BACK PAIN WITH BILATERAL SCIATICA: Primary | ICD-10-CM

## 2019-06-03 PROCEDURE — 99214 PR OFFICE/OUTPT VISIT, EST, LEVL IV, 30-39 MIN: ICD-10-PCS | Mod: S$GLB,,, | Performed by: PHYSICAL MEDICINE & REHABILITATION

## 2019-06-03 PROCEDURE — 99999 PR PBB SHADOW E&M-EST. PATIENT-LVL III: CPT | Mod: PBBFAC,,, | Performed by: PHYSICAL MEDICINE & REHABILITATION

## 2019-06-03 PROCEDURE — 3008F BODY MASS INDEX DOCD: CPT | Mod: CPTII,S$GLB,, | Performed by: PHYSICAL MEDICINE & REHABILITATION

## 2019-06-03 PROCEDURE — 99214 OFFICE O/P EST MOD 30 MIN: CPT | Mod: S$GLB,,, | Performed by: PHYSICAL MEDICINE & REHABILITATION

## 2019-06-03 PROCEDURE — 99999 PR PBB SHADOW E&M-EST. PATIENT-LVL III: ICD-10-PCS | Mod: PBBFAC,,, | Performed by: PHYSICAL MEDICINE & REHABILITATION

## 2019-06-03 PROCEDURE — 3008F PR BODY MASS INDEX (BMI) DOCUMENTED: ICD-10-PCS | Mod: CPTII,S$GLB,, | Performed by: PHYSICAL MEDICINE & REHABILITATION

## 2019-06-03 PROCEDURE — 80307 DRUG TEST PRSMV CHEM ANLYZR: CPT

## 2019-06-03 RX ORDER — HYDROCODONE BITARTRATE AND ACETAMINOPHEN 7.5; 325 MG/1; MG/1
1 TABLET ORAL 3 TIMES DAILY PRN
Qty: 90 TABLET | Refills: 0 | Status: SHIPPED | OUTPATIENT
Start: 2019-06-07 | End: 2019-07-09 | Stop reason: SDUPTHER

## 2019-06-03 RX ORDER — DICLOFENAC SODIUM 75 MG/1
75 TABLET, DELAYED RELEASE ORAL 2 TIMES DAILY
Qty: 60 TABLET | Refills: 4 | Status: SHIPPED | OUTPATIENT
Start: 2019-06-03 | End: 2019-12-01 | Stop reason: SDUPTHER

## 2019-06-03 RX ORDER — TIZANIDINE 4 MG/1
4 TABLET ORAL 3 TIMES DAILY PRN
Qty: 90 TABLET | Refills: 3 | Status: SHIPPED | OUTPATIENT
Start: 2019-06-03 | End: 2019-08-26 | Stop reason: SDUPTHER

## 2019-06-03 NOTE — PROGRESS NOTES
Subjective:       Patient ID: Roxanne Templeton is a 54 y.o. female.    Chief Complaint: No chief complaint on file.    HPI    HISTORY OF PRESENT ILLNESS:  Ms. Templeton is a 54-year-old white female who was   followed up in the Physical Medicine Clinic for chronic low back pain with   lumbar radiculopathy.  Her last visit to the clinic was on 02/11/2019.  Her back   pain has been getting worse.  An MRI of the lumbar spine was ordered.  She was   maintained on diclofenac, venlafaxine, p.r.n. hydrocodone/APAP and p.r.n.   tizanidine.    The patient has come to the clinic for followup.  Her low back pain has been   about the same.  It is a constant aching pain in the lumbar spine and across her   back.  It just shoots to the right foot with numbness, but recently has been   shooting down to the left foot.  Her pain is worse with activity.  Her maximum   pain 7-8/10 and minimum 1-2/10.  The patient complains of bilateral lower   extremity weakness.  She denies any falls.  She denies any bowel or bladder   incontinence.    She has been complaining for the last eight weeks of left hand numbness.  She   started having similar symptoms in her right hand a couple of weeks ago.  Her   hand numbness occasionally shoots up to the left shoulder.  She complains of   hand and  weakness.    She is currently taking diclofenac 75 mg p.o. twice per day, venlafaxine    mg capsule and one 37.5 mg capsule together once per day.  She takes   hydrocodone/APAP 7.5/325 p.r.n. three times per day.  She takes tizanidine 4 mg   p.r.n. twice per day.      MS/HN  dd: 06/03/2019 11:49:04 (CDT)  td: 06/03/2019 23:17:44 (CDT)  Doc ID   #[id number]  Job ID #[id number]    CC:           Review of Systems   Constitutional: Negative for fatigue.   Eyes: Negative for visual disturbance.   Respiratory: Negative for shortness of breath.    Cardiovascular: Negative for chest pain.   Gastrointestinal: Negative for blood in stool, constipation, nausea and  vomiting.   Genitourinary: Negative for difficulty urinating.   Musculoskeletal: Positive for back pain. Negative for arthralgias, gait problem and neck pain.   Neurological: Positive for dizziness. Negative for headaches.   Psychiatric/Behavioral: Positive for sleep disturbance. Negative for behavioral problems.       Objective:      Physical Exam   Constitutional: She appears well-developed and well-nourished. No distress.   Neck: Normal range of motion.   Musculoskeletal:   BUE:  ROM:full.  Strength:    RUE: 5/5 at shoulder abduction, 5 elbow flexion, 5 elbow extension, 4 hand .   LUE: 5/5 at shoulder abduction, 5 elbow flexion, 5 elbow extension, 4 hand .  Sensation to pinprick:   RUE: decreased digit 1.   LUE: hypersensitive digit 1.    Tinnel's test:    RUE: -ve.   LUE: +ve.  Carpal Compression test:    RUE: +ve.   LUE: +ve.    BLE:  ROM:   RLE: full.   LLE: full.  Knee crepitus:   RLE: mild.   LLE: mild.   Strength:    RLE: 5/5 at hip flexion, 5 knee extension, 5 ankle DF, 5 PF.   LLE: 5/5 at hip flexion, 5 knee extension, 5 ankle DF, 5 PF.  Sensation to pinprick:     RLE: intact.      LLE: decreased.   DTR:     RLE: +3 knee, +1 ankle.    LLE: +3 knee, +1 ankle.  Clonus:    Rt ankle: -ve.    Lt ankle: -ve.  SLR (sitting):      RLE: +ve.      LLE: +ve.    Mild tenderness over lumbar spine.      Gait: WNL       Neurological: She is alert.   Psychiatric: She has a normal mood and affect. Her behavior is normal.   Vitals reviewed.            Assessment:       1. Chronic midline low back pain with bilateral sciatica    2. DDD (degenerative disc disease), lumbar    3. Chronic use of opiate for therapeutic purpose        Plan:     -  We will hold off on MRI Lumbar Spine (cost considerations).  -  Continue diclofenac (VOLTAREN) 75 MG EC tablet; Take 1 tablet (75 mg total) by mouth 2 (two) times daily.  - Continue venlafaxine (EFFEXOR-XR) 150 MG Cp24 and two 37.5 MG 24 hr capsules; Take 1 capsule of each by  mouth once daily.  - Continu HYDROcodone-acetaminophen (NORCO) 7.5-325 mg per tablet; Take 1 tablet by mouth 3 (three) times daily as needed for Pain.  - Continue  tiZANidine (ZANAFLEX) 4 MG tablet; Take 1 tablet (4 mg total) by mouth 3 (three) times daily as needed.  - Consult [facility] .  - Regular home exercise program was encouraged.  - Pain Clinic Drug Screen; Future  - Follow up in about 3 months (around 9/3/2019).     This was a 25 minute visit, more than 50% of which was spent counseling the patient about the diagnosis and the treatment plan.

## 2019-06-10 ENCOUNTER — TELEPHONE (OUTPATIENT)
Dept: ADMINISTRATIVE | Facility: HOSPITAL | Age: 54
End: 2019-06-10

## 2019-06-10 ENCOUNTER — PATIENT OUTREACH (OUTPATIENT)
Dept: ADMINISTRATIVE | Facility: HOSPITAL | Age: 54
End: 2019-06-10

## 2019-06-10 DIAGNOSIS — Z00.00 ANNUAL PHYSICAL EXAM: Primary | ICD-10-CM

## 2019-06-10 DIAGNOSIS — M79.642 BILATERAL HAND PAIN: Primary | ICD-10-CM

## 2019-06-10 DIAGNOSIS — M79.641 BILATERAL HAND PAIN: Primary | ICD-10-CM

## 2019-06-10 NOTE — TELEPHONE ENCOUNTER
Please sign pending CBC order. Please add any additional labs needed. I already ordered the CMP, LIPIDS and TSH.      Thanks,  Esther

## 2019-06-10 NOTE — PROGRESS NOTES
Spoke with pt, reminded of PCP visit due on 6-18-19 , Scheduled her MMG, and Fasting Lab appointments. She has been instructed on the Fit Kit due and verbalized understanding .She will pick it up at her visit.

## 2019-06-11 DIAGNOSIS — G89.29 CHRONIC MIDLINE LOW BACK PAIN WITHOUT SCIATICA: ICD-10-CM

## 2019-06-11 DIAGNOSIS — Z12.11 ENCOUNTER FOR FECAL IMMUNOCHEMICAL TEST SCREENING: Primary | ICD-10-CM

## 2019-06-11 DIAGNOSIS — M54.50 CHRONIC MIDLINE LOW BACK PAIN WITHOUT SCIATICA: ICD-10-CM

## 2019-06-11 RX ORDER — VENLAFAXINE HYDROCHLORIDE 37.5 MG/1
37.5 CAPSULE, EXTENDED RELEASE ORAL DAILY
Qty: 30 CAPSULE | Refills: 3 | Status: SHIPPED | OUTPATIENT
Start: 2019-06-11 | End: 2019-09-05

## 2019-06-11 RX ORDER — VENLAFAXINE HYDROCHLORIDE 150 MG/1
150 CAPSULE, EXTENDED RELEASE ORAL DAILY
Qty: 30 CAPSULE | Refills: 3 | Status: SHIPPED | OUTPATIENT
Start: 2019-06-11 | End: 2019-10-09 | Stop reason: SDUPTHER

## 2019-06-12 ENCOUNTER — TELEPHONE (OUTPATIENT)
Dept: ORTHOPEDICS | Facility: CLINIC | Age: 54
End: 2019-06-12

## 2019-06-12 LAB
6MAM UR QL: NOT DETECTED
7AMINOCLONAZEPAM UR QL: NOT DETECTED
A-OH ALPRAZ UR QL: NOT DETECTED
ALPRAZ UR QL: NOT DETECTED
AMPHET UR QL SCN: NOT DETECTED
ANNOTATION COMMENT IMP: NORMAL
ANNOTATION COMMENT IMP: NORMAL
BARBITURATES UR QL: NOT DETECTED
BUPRENORPHINE UR QL: NOT DETECTED
BZE UR QL: NOT DETECTED
CARBOXYTHC UR QL: NOT DETECTED
CARISOPRODOL UR QL: NOT DETECTED
CLONAZEPAM UR QL: NOT DETECTED
CODEINE UR QL: NOT DETECTED
CREAT UR-MCNC: 23.6 MG/DL (ref 20–400)
DIAZEPAM UR QL: NOT DETECTED
ETHYL GLUCURONIDE UR QL: NOT DETECTED
FENTANYL UR QL: NOT DETECTED
HYDROCODONE UR QL: PRESENT
HYDROMORPHONE UR QL: NOT DETECTED
LORAZEPAM UR QL: NOT DETECTED
MDA UR QL: NOT DETECTED
MDEA UR QL: NOT DETECTED
MDMA UR QL: NOT DETECTED
ME-PHENIDATE UR QL: NOT DETECTED
MEPERIDINE UR QL: NOT DETECTED
METHADONE UR QL: NOT DETECTED
METHAMPHET UR QL: NOT DETECTED
MIDAZOLAM UR QL SCN: NOT DETECTED
MORPHINE UR QL: NOT DETECTED
NORBUPRENORPHINE UR QL CFM: NOT DETECTED
NORDIAZEPAM UR QL: NOT DETECTED
NORFENTANYL UR QL: NOT DETECTED
NORHYDROCODONE UR QL CFM: NOT DETECTED
NOROXYCODONE UR QL CFM: NOT DETECTED
NOROXYMORPHONE: NOT DETECTED
OXAZEPAM UR QL: NOT DETECTED
OXYCODONE UR QL: NOT DETECTED
OXYMORPHONE UR QL: NOT DETECTED
PATHOLOGY STUDY: NORMAL
PCP UR QL: NOT DETECTED
PHENTERMINE UR QL: NOT DETECTED
PROPOXYPH UR QL: NOT DETECTED
SERVICE CMNT-IMP: NORMAL
TAPENTADOL UR QL SCN: NOT DETECTED
TAPENTADOL-O-SULF: NOT DETECTED
TEMAZEPAM UR QL: NOT DETECTED
TRAMADOL UR QL: NOT DETECTED
ZOLPIDEM UR QL: NOT DETECTED

## 2019-06-12 NOTE — TELEPHONE ENCOUNTER
Roxanne Templeton reminded of appointment on 6/13/19 with Dr. MALINI Camacho w/time and location. Notified of need for xray before OV w/date, time, and location of appts.

## 2019-06-13 ENCOUNTER — HOSPITAL ENCOUNTER (OUTPATIENT)
Dept: RADIOLOGY | Facility: OTHER | Age: 54
Discharge: HOME | End: 2019-06-13
Attending: ORTHOPAEDIC SURGERY
Payer: COMMERCIAL

## 2019-06-13 ENCOUNTER — TELEPHONE (OUTPATIENT)
Dept: ORTHOPEDICS | Facility: CLINIC | Age: 54
End: 2019-06-13

## 2019-06-13 ENCOUNTER — OFFICE VISIT (OUTPATIENT)
Dept: ORTHOPEDICS | Facility: CLINIC | Age: 54
End: 2019-06-13
Payer: COMMERCIAL

## 2019-06-13 VITALS
HEIGHT: 69 IN | BODY MASS INDEX: 22.07 KG/M2 | SYSTOLIC BLOOD PRESSURE: 115 MMHG | WEIGHT: 149 LBS | HEART RATE: 85 BPM | DIASTOLIC BLOOD PRESSURE: 76 MMHG

## 2019-06-13 DIAGNOSIS — G56.23 CUBITAL TUNNEL SYNDROME, BILATERAL: ICD-10-CM

## 2019-06-13 DIAGNOSIS — G56.03 BILATERAL CARPAL TUNNEL SYNDROME: Primary | ICD-10-CM

## 2019-06-13 DIAGNOSIS — M79.641 BILATERAL HAND PAIN: ICD-10-CM

## 2019-06-13 DIAGNOSIS — M79.642 BILATERAL HAND PAIN: ICD-10-CM

## 2019-06-13 PROCEDURE — 3008F PR BODY MASS INDEX (BMI) DOCUMENTED: ICD-10-PCS | Mod: CPTII,S$GLB,, | Performed by: ORTHOPAEDIC SURGERY

## 2019-06-13 PROCEDURE — 73130 XR HAND COMPLETE 3 VIEWS BILATERAL: ICD-10-PCS | Mod: 26,,, | Performed by: RADIOLOGY

## 2019-06-13 PROCEDURE — 73130 X-RAY EXAM OF HAND: CPT | Mod: 26,,, | Performed by: RADIOLOGY

## 2019-06-13 PROCEDURE — 99204 OFFICE O/P NEW MOD 45 MIN: CPT | Mod: S$GLB,,, | Performed by: ORTHOPAEDIC SURGERY

## 2019-06-13 PROCEDURE — 99999 PR PBB SHADOW E&M-EST. PATIENT-LVL III: ICD-10-PCS | Mod: PBBFAC,,, | Performed by: ORTHOPAEDIC SURGERY

## 2019-06-13 PROCEDURE — 99999 PR PBB SHADOW E&M-EST. PATIENT-LVL III: CPT | Mod: PBBFAC,,, | Performed by: ORTHOPAEDIC SURGERY

## 2019-06-13 PROCEDURE — 3008F BODY MASS INDEX DOCD: CPT | Mod: CPTII,S$GLB,, | Performed by: ORTHOPAEDIC SURGERY

## 2019-06-13 PROCEDURE — 73130 X-RAY EXAM OF HAND: CPT | Mod: 50,TC,FY

## 2019-06-13 PROCEDURE — 99204 PR OFFICE/OUTPT VISIT, NEW, LEVL IV, 45-59 MIN: ICD-10-PCS | Mod: S$GLB,,, | Performed by: ORTHOPAEDIC SURGERY

## 2019-06-13 NOTE — TELEPHONE ENCOUNTER
Spoke w/pt, she states she wants her xray results/report. Phone number to medical records provided to pt [phone].

## 2019-06-13 NOTE — TELEPHONE ENCOUNTER
----- Message from Razia Carbajal sent at 6/13/2019  3:04 PM CDT -----  Contact: pt  Name of Who is Calling: DEJON MULLINS [[id number]]      What is the request in detail: pt calling for results.. Please advise      Can the clinic reply by [facility]: no      What Number [facility] Back [facility] not [facility]: [phone]

## 2019-06-13 NOTE — PROGRESS NOTES
06/13/2019  Chief Complaint   Patient presents with    Right Hand - Pain    Left Hand - Pain        History of Present Illness: Roxanne Templeton is a RHD 54 y.o. year old female patient here for initial evaluation of bilateral hand numbness, left worse than right, that has been present for 8 weeks. The patient describes the pain as pins and needles in her fingers with intermittent numbness. Her hands go numb at night. She was diagnosed with carpal tunnel syndrome in the past and wore braces, but they are worn out now. She is a smoker.     Past Medical History:  Active Ambulatory Problems     Diagnosis Date Noted    Nicotine abuse 11/06/2012    Bilateral sciatica 11/06/2012    Chronic LBP 06/24/2013    DDD (degenerative disc disease), lumbar 06/24/2013    Lumbar radiculopathy 06/24/2013    Sciatica 07/11/2013    Hip pain 07/11/2013    Numbness and tingling of both legs 07/11/2013    Decreased spinal mobility 07/11/2013    Low back pain 07/16/2013    Weakness of both legs 07/16/2013    Decreased range of motion of hip 07/16/2013    Back pain 07/19/2013    Weakness of trunk musculature 07/19/2013    Range of motion deficit 07/19/2013    Numbness and tingling of leg 07/22/2013    Numbness and tingling 07/25/2013    Arm pain 08/07/2014    Chronic low back pain 09/10/2015     Resolved Ambulatory Problems     Diagnosis Date Noted    No Resolved Ambulatory Problems     Past Medical History:   Diagnosis Date    Allergy     Chronic back pain     Chronic back pain     Hyperlipidemia      Past Surgical History:   Procedure Laterality Date    COLONOSCOPY N/A 6/11/2013    Performed [facility] at [facility] ([address] [place] [address])    FOOT SURGERY Left     toe    HYSTERECTOMY  1995    [facility] ovaries remain    TUBAL LIGATION       Medications:  Entered into EMR  Review of patient's allergies indicates:   Allergen Reactions    Dilaudid  [hydromorphone (bulk)]      Other reaction(s): Itching     Latex, natural rubber     Zithromax  [azithromycin]      Other reaction(s): Hives     ROS:   Negative except as stated in HPI.    Social History     Socioeconomic History    Marital status:      Spouse name: Alec    Number of children: 2    Years of education: Not on file    Highest education level: Not on file   Occupational History    Occupation: Personal Care Manager     Employer: [employer]      Employer:  [employer]    Social Needs    Financial resource strain: Not on file    Food insecurity:     Worry: Not on file     Inability: Not on file    Transportation needs:     Medical: Not on file     Non-medical: Not on file   Tobacco Use    Smoking status: Former Smoker     Packs/day: 0.50     Years: 30.00     Pack years: 15.00     Start date: 1/1/1981    Smokeless tobacco: Never Used   Substance and Sexual Activity    Alcohol use: No     Alcohol/week: 0.0 oz    Drug use: No    Sexual activity: Yes     Partners: Male     Comment: , together x 23 years, since 1995   Lifestyle    Physical activity:     Days per week: Not on file     Minutes per session: Not on file    Stress: Not on file   Relationships    Social connections:     Talks on phone: Not on file     Gets together: Not on file     Attends [employer] service: Not on file     Active member of club or organization: Not on file     Attends meetings of clubs or organizations: Not on file     Relationship status: Not on file   Other Topics Concern    Not on file   Social History Narrative    Not on file     Family History   Problem Relation Age of Onset    Colon polyps Mother     Diabetes Neg Hx     Heart disease Neg Hx     Breast cancer Neg Hx     Hypertension Neg Hx     Ovarian cancer Neg Hx      Physical Examination:  Vital Signs:   Vitals:    06/13/19 0951   BP: 115/76   Pulse: 85     General: Awake, alert, oriented x3. NAD  Psych: Mood and affect normal  HEENT: NC/AT  Cardio:  Regular rate  Respiratory: Clear, equal, and unlabored respirations  Skin/Neuro/MSK:    RUE: No obvious deformity. Neurovascularly intact distally, motor function inact AIN/PIN/Ulnar nerves. Sensation intact to light touch m/u/r distributions. Cap refill < 2 sec. 2+ radial pulse, full range of motion. Positive Tinel's at the wrist and elbow, positive Durkan's    LUE: No obvious deformity. Neurovascularly intact distally, motor function inact AIN/PIN/Ulnar nerves. Sensation intact to light touch m/u/r distributions. Cap refill < 2 sec. 2+ radial pulse, full range of motion. Positive Tinel's at the wrist and elbow, positive Durkan's, positive ulnar nerve compression test     Radiographs:  Radiographs of the bilateral wrists were ordered and personally reviewed with the patient today.     Impression:  Bilateral carpal tunnel syndrome  Bilateral cubital tunnel syndrome     Discussion/Plan:    - EMG ordered  - Dispensed bilateral wrist braces to wear at night  - F/u for EMG results

## 2019-06-13 NOTE — LETTER
June 14, 2019      Princess Hung MD  [address]  [facility] [address]           [facility] [place] [facility]  [address], [address]  [facility] [address]  Phone: [phone]          Patient: Roxanne Templeton   MR Number: [id number]   YOB: 1965   Date of Visit: 6/13/2019       Dear Dr. Princess Hung:    Thank you for referring Roxanne Templeton to me for evaluation. Attached you will find relevant portions of my assessment and plan of care.    If you have questions, please do not hesitate to call me. I look forward to following Roxanne Templeton along with you.    Sincerely,    Martha Camacho MD    Enclosure  CC:  No Recipients    If you would like to receive this communication electronically, please contact [email or web address] or [phone] to request more information on "[employer] Link access.    For providers and/or their staff who would like to refer a patient to [facility], please contact us through our one-stop-shop provider referral line, [facility] [place], at [phone].    If you feel you have received this communication in error or would no longer like to receive these types of communications, please e-mail [email or web address]

## 2019-06-14 ENCOUNTER — TELEPHONE (OUTPATIENT)
Dept: ORTHOPEDICS | Facility: CLINIC | Age: 54
End: 2019-06-14

## 2019-06-14 NOTE — TELEPHONE ENCOUNTER
----- Message from Adriana Pollard sent at 6/14/2019  9:35 AM CDT -----  Contact: Self  Name of Who is Calling: DEJON MULLINS [[id number]]      What is the request in detail: pt states that she have a few questions for Dr. Camacho that only she can answer. Please contact to further discuss and advise.    Can the clinic reply by [facility]: N     What Number [facility] Back [facility]: [phone]

## 2019-06-15 ENCOUNTER — LAB VISIT (OUTPATIENT)
Dept: LAB | Facility: HOSPITAL | Age: 54
End: 2019-06-15
Payer: COMMERCIAL

## 2019-06-15 DIAGNOSIS — Z00.00 ANNUAL PHYSICAL EXAM: ICD-10-CM

## 2019-06-15 LAB
ALBUMIN SERPL BCP-MCNC: 3.9 G/DL (ref 3.5–5.2)
ALP SERPL-CCNC: 49 U/L (ref 55–135)
ALT SERPL W/O P-5'-P-CCNC: 12 U/L (ref 10–44)
ANION GAP SERPL CALC-SCNC: 14 MMOL/L (ref 8–16)
AST SERPL-CCNC: 18 U/L (ref 10–40)
BASOPHILS # BLD AUTO: 0.02 K/UL (ref 0–0.2)
BASOPHILS NFR BLD: 0.3 % (ref 0–1.9)
BILIRUB SERPL-MCNC: 0.4 MG/DL (ref 0.1–1)
BUN SERPL-MCNC: 14 MG/DL (ref 6–20)
CALCIUM SERPL-MCNC: 9.2 MG/DL (ref 8.7–10.5)
CHLORIDE SERPL-SCNC: 105 MMOL/L (ref 95–110)
CHOLEST SERPL-MCNC: 179 MG/DL (ref 120–199)
CHOLEST/HDLC SERPL: 4.1 {RATIO} (ref 2–5)
CO2 SERPL-SCNC: 21 MMOL/L (ref 23–29)
CREAT SERPL-MCNC: 0.8 MG/DL (ref 0.5–1.4)
DIFFERENTIAL METHOD: ABNORMAL
EOSINOPHIL # BLD AUTO: 0 K/UL (ref 0–0.5)
EOSINOPHIL NFR BLD: 0.5 % (ref 0–8)
ERYTHROCYTE [DISTWIDTH] IN BLOOD BY AUTOMATED COUNT: 15 % (ref 11.5–14.5)
EST. GFR  (AFRICAN AMERICAN): >60 ML/MIN/1.73 M^2
EST. GFR  (NON AFRICAN AMERICAN): >60 ML/MIN/1.73 M^2
GLUCOSE SERPL-MCNC: 100 MG/DL (ref 70–110)
HCT VFR BLD AUTO: 38.9 % (ref 37–48.5)
HDLC SERPL-MCNC: 44 MG/DL (ref 40–75)
HDLC SERPL: 24.6 % (ref 20–50)
HGB BLD-MCNC: 12.2 G/DL (ref 12–16)
LDLC SERPL CALC-MCNC: 108.8 MG/DL (ref 63–159)
LYMPHOCYTES # BLD AUTO: 2.8 K/UL (ref 1–4.8)
LYMPHOCYTES NFR BLD: 48.9 % (ref 18–48)
MCH RBC QN AUTO: 26.6 PG (ref 27–31)
MCHC RBC AUTO-ENTMCNC: 31.4 G/DL (ref 32–36)
MCV RBC AUTO: 85 FL (ref 82–98)
MONOCYTES # BLD AUTO: 0.5 K/UL (ref 0.3–1)
MONOCYTES NFR BLD: 8.4 % (ref 4–15)
NEUTROPHILS # BLD AUTO: 2.4 K/UL (ref 1.8–7.7)
NEUTROPHILS NFR BLD: 41.9 % (ref 38–73)
NONHDLC SERPL-MCNC: 135 MG/DL
PLATELET # BLD AUTO: 240 K/UL (ref 150–350)
PMV BLD AUTO: 9.1 FL (ref 9.2–12.9)
POTASSIUM SERPL-SCNC: 4.4 MMOL/L (ref 3.5–5.1)
PROT SERPL-MCNC: 6.9 G/DL (ref 6–8.4)
RBC # BLD AUTO: 4.58 M/UL (ref 4–5.4)
SODIUM SERPL-SCNC: 140 MMOL/L (ref 136–145)
TRIGL SERPL-MCNC: 131 MG/DL (ref 30–150)
TSH SERPL DL<=0.005 MIU/L-ACNC: 1.11 UIU/ML (ref 0.4–4)
WBC # BLD AUTO: 5.73 K/UL (ref 3.9–12.7)

## 2019-06-15 PROCEDURE — 85025 COMPLETE CBC W/AUTO DIFF WBC: CPT

## 2019-06-15 PROCEDURE — 80053 COMPREHEN METABOLIC PANEL: CPT

## 2019-06-15 PROCEDURE — 80061 LIPID PANEL: CPT

## 2019-06-15 PROCEDURE — 84443 ASSAY THYROID STIM HORMONE: CPT

## 2019-06-15 PROCEDURE — 36415 COLL VENOUS BLD VENIPUNCTURE: CPT

## 2019-06-18 ENCOUNTER — OFFICE VISIT (OUTPATIENT)
Dept: INTERNAL MEDICINE | Facility: CLINIC | Age: 54
End: 2019-06-18
Payer: COMMERCIAL

## 2019-06-18 ENCOUNTER — HOSPITAL ENCOUNTER (OUTPATIENT)
Dept: RADIOLOGY | Facility: HOSPITAL | Age: 54
Discharge: HOME | End: 2019-06-18
Attending: OBSTETRICS & GYNECOLOGY
Payer: COMMERCIAL

## 2019-06-18 VITALS
HEIGHT: 69 IN | DIASTOLIC BLOOD PRESSURE: 60 MMHG | BODY MASS INDEX: 20.59 KG/M2 | SYSTOLIC BLOOD PRESSURE: 104 MMHG | WEIGHT: 139 LBS

## 2019-06-18 DIAGNOSIS — Z12.31 SCREENING MAMMOGRAM, ENCOUNTER FOR: ICD-10-CM

## 2019-06-18 DIAGNOSIS — Z00.00 HEALTH CARE MAINTENANCE: Primary | ICD-10-CM

## 2019-06-18 PROCEDURE — 99396 PR PREVENTIVE VISIT,EST,40-64: ICD-10-PCS | Mod: S$GLB,,, | Performed by: INTERNAL MEDICINE

## 2019-06-18 PROCEDURE — 77067 MAMMO DIGITAL SCREENING BILAT WITH TOMOSYNTHESIS_CAD: ICD-10-PCS | Mod: 26,,, | Performed by: RADIOLOGY

## 2019-06-18 PROCEDURE — 77063 BREAST TOMOSYNTHESIS BI: CPT | Mod: 26,,, | Performed by: RADIOLOGY

## 2019-06-18 PROCEDURE — 99396 PREV VISIT EST AGE 40-64: CPT | Mod: S$GLB,,, | Performed by: INTERNAL MEDICINE

## 2019-06-18 PROCEDURE — 77067 SCR MAMMO BI INCL CAD: CPT | Mod: 26,,, | Performed by: RADIOLOGY

## 2019-06-18 PROCEDURE — 99999 PR PBB SHADOW E&M-EST. PATIENT-LVL III: ICD-10-PCS | Mod: PBBFAC,,, | Performed by: INTERNAL MEDICINE

## 2019-06-18 PROCEDURE — 77067 SCR MAMMO BI INCL CAD: CPT | Mod: TC

## 2019-06-18 PROCEDURE — 99999 PR PBB SHADOW E&M-EST. PATIENT-LVL III: CPT | Mod: PBBFAC,,, | Performed by: INTERNAL MEDICINE

## 2019-06-18 PROCEDURE — 77063 MAMMO DIGITAL SCREENING BILAT WITH TOMOSYNTHESIS_CAD: ICD-10-PCS | Mod: 26,,, | Performed by: RADIOLOGY

## 2019-06-18 NOTE — PROGRESS NOTES
"Subjective:       Patient ID: Roxanne Templeton is a 54 y.o. female.    Chief Complaint: Annual Exam (yearly check up.)    HPI   Roxanne Templeton is a 54 y.o. female here for a yearly preventative healthcare visit.     In December started atorva 40mg.    She sees Dr. Hung for chronic LBP with lumbar DJD and radiculopathy on on diclofenac, venlafaxine, p.r.n. hydrocodone and p.r.n. Tizanidine.    Few weeks of coughing, mild mucus.     Bilateral carpal tunnel syndrome; EMG ordered.     Review of Systems   Constitutional: Negative for fever.   HENT: Negative.    Eyes: Negative.    Respiratory: Negative for shortness of breath.    Cardiovascular: Negative for chest pain and leg swelling.   Gastrointestinal: Negative for abdominal pain, diarrhea, nausea and vomiting.   Genitourinary: Negative.    Musculoskeletal: Negative for arthralgias.   Skin: Negative for rash.   Psychiatric/Behavioral: Negative.        Objective:   /60 (BP Location: Left arm, Patient Position: Sitting, BP Method: Medium (Manual))   Ht 5' 9" (1.753 m)   Wt 63 kg (139 lb)   BMI 20.53 kg/m²      Physical Exam   Constitutional: She is oriented to person, place, and time. She appears well-developed and well-nourished.   HENT:   Head: Normocephalic and atraumatic.   Eyes: Pupils are equal, round, and reactive to light. Conjunctivae and EOM are normal.   Neck: Neck supple. No thyromegaly present.   Cardiovascular: Normal rate, regular rhythm and normal heart sounds.   No murmur heard.  Pulmonary/Chest: Effort normal and breath sounds normal. No respiratory distress. She has no wheezes.   Abdominal: Soft. Bowel sounds are normal. She exhibits no distension. There is no tenderness.   Musculoskeletal: Normal range of motion.   Neurological: She is alert and oriented to person, place, and time.   Skin: Skin is warm and dry. No rash noted.   Psychiatric: She has a normal mood and affect. Judgment and thought content normal.   Vitals reviewed.    "   Assessment:       1. Health care maintenance        Plan:       Roxanne was seen today for annual exam.    Diagnoses and all orders for this visit:    Health care maintenance  Labs reviewed and in goal range  Continue current medications  Return yearly or PRN  Labs in 1 year

## 2019-06-20 ENCOUNTER — TELEPHONE (OUTPATIENT)
Dept: PHYSICAL MEDICINE AND REHAB | Facility: CLINIC | Age: 54
End: 2019-06-20

## 2019-06-20 DIAGNOSIS — R20.0 BILATERAL HAND NUMBNESS: Primary | ICD-10-CM

## 2019-06-20 NOTE — TELEPHONE ENCOUNTER
----- Message from Fionayolanda Mosessparkle sent at 6/20/2019  3:24 PM CDT -----  Contact: self @ [phone]  Pt says she was in to see Dr Hung on 6-3-19 and was offered a wrist band.  Pt says she had one.  Pt says since then her left one tore.  She is calling to see if he can send in an order for a left wrist brace.  Pls call to discuss.

## 2019-06-21 ENCOUNTER — TELEPHONE (OUTPATIENT)
Dept: PHYSICAL MEDICINE AND REHAB | Facility: CLINIC | Age: 54
End: 2019-06-21
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
## 2019-07-09 DIAGNOSIS — M54.41 CHRONIC MIDLINE LOW BACK PAIN WITH BILATERAL SCIATICA: ICD-10-CM

## 2019-07-09 DIAGNOSIS — G89.29 CHRONIC MIDLINE LOW BACK PAIN WITH BILATERAL SCIATICA: ICD-10-CM

## 2019-07-09 DIAGNOSIS — M54.42 CHRONIC MIDLINE LOW BACK PAIN WITH BILATERAL SCIATICA: ICD-10-CM

## 2019-07-09 RX ORDER — HYDROCODONE BITARTRATE AND ACETAMINOPHEN 7.5; 325 MG/1; MG/1
1 TABLET ORAL 3 TIMES DAILY PRN
Qty: 90 TABLET | Refills: 0 | Status: SHIPPED | OUTPATIENT
Start: 2019-07-09 | End: 2019-08-08 | Stop reason: SDUPTHER

## 2019-07-09 NOTE — TELEPHONE ENCOUNTER
Last Rx refill-----06/03/19  Last office visit--06/03/19  Next office visit--10/08/19          ----- Message from Fiona Gonzales sent at 7/9/2019  1:33 PM CDT -----  Contact: self @ [phone]  Pt is req a refill for hydrocodone 7.5/325.    [facility]/pharmacy #[id number] - [place], [place] - [address] AT [facility] [phone] (Phone)       [phone] (Fax)

## 2019-07-23 ENCOUNTER — PATIENT MESSAGE (OUTPATIENT)
Dept: PHYSICAL MEDICINE AND REHAB | Facility: CLINIC | Age: 54
End: 2019-07-23

## 2019-07-28 ENCOUNTER — PATIENT MESSAGE (OUTPATIENT)
Dept: PHYSICAL MEDICINE AND REHAB | Facility: CLINIC | Age: 54
End: 2019-07-28

## 2019-07-30 ENCOUNTER — PATIENT MESSAGE (OUTPATIENT)
Dept: PHYSICAL MEDICINE AND REHAB | Facility: CLINIC | Age: 54
End: 2019-07-30

## 2019-07-30 RX ORDER — DIAZEPAM 5 MG/1
5 TABLET ORAL SEE ADMIN INSTRUCTIONS
Qty: 2 TABLET | Refills: 0 | Status: SHIPPED | OUTPATIENT
Start: 2019-07-30 | End: 2019-08-08 | Stop reason: SDUPTHER

## 2019-08-01 ENCOUNTER — PATIENT MESSAGE (OUTPATIENT)
Dept: PHYSICAL MEDICINE AND REHAB | Facility: CLINIC | Age: 54
End: 2019-08-01

## 2019-08-01 DIAGNOSIS — M51.36 DDD (DEGENERATIVE DISC DISEASE), LUMBAR: ICD-10-CM

## 2019-08-01 DIAGNOSIS — M54.42 CHRONIC MIDLINE LOW BACK PAIN WITH BILATERAL SCIATICA: Primary | ICD-10-CM

## 2019-08-01 DIAGNOSIS — G89.29 CHRONIC MIDLINE LOW BACK PAIN WITH BILATERAL SCIATICA: Primary | ICD-10-CM

## 2019-08-01 DIAGNOSIS — M54.41 CHRONIC MIDLINE LOW BACK PAIN WITH BILATERAL SCIATICA: Primary | ICD-10-CM

## 2019-08-08 DIAGNOSIS — G89.29 CHRONIC MIDLINE LOW BACK PAIN WITH BILATERAL SCIATICA: ICD-10-CM

## 2019-08-08 DIAGNOSIS — M54.41 CHRONIC MIDLINE LOW BACK PAIN WITH BILATERAL SCIATICA: ICD-10-CM

## 2019-08-08 DIAGNOSIS — M54.42 CHRONIC MIDLINE LOW BACK PAIN WITH BILATERAL SCIATICA: ICD-10-CM

## 2019-08-08 RX ORDER — DIAZEPAM 5 MG/1
5 TABLET ORAL SEE ADMIN INSTRUCTIONS
Qty: 2 TABLET | Refills: 0 | Status: SHIPPED | OUTPATIENT
Start: 2019-08-08

## 2019-08-08 RX ORDER — HYDROCODONE BITARTRATE AND ACETAMINOPHEN 7.5; 325 MG/1; MG/1
1 TABLET ORAL 3 TIMES DAILY PRN
Qty: 90 TABLET | Refills: 0 | Status: SHIPPED | OUTPATIENT
Start: 2019-08-08 | End: 2019-09-09 | Stop reason: SDUPTHER

## 2019-08-09 ENCOUNTER — HOSPITAL ENCOUNTER (OUTPATIENT)
Dept: RADIOLOGY | Facility: HOSPITAL | Age: 54
Discharge: HOME | End: 2019-08-09
Attending: PHYSICAL MEDICINE & REHABILITATION
Payer: COMMERCIAL

## 2019-08-09 DIAGNOSIS — M51.36 DDD (DEGENERATIVE DISC DISEASE), LUMBAR: ICD-10-CM

## 2019-08-09 DIAGNOSIS — G89.29 CHRONIC MIDLINE LOW BACK PAIN WITH BILATERAL SCIATICA: ICD-10-CM

## 2019-08-09 DIAGNOSIS — M54.42 CHRONIC MIDLINE LOW BACK PAIN WITH BILATERAL SCIATICA: ICD-10-CM

## 2019-08-09 DIAGNOSIS — M54.41 CHRONIC MIDLINE LOW BACK PAIN WITH BILATERAL SCIATICA: ICD-10-CM

## 2019-08-09 PROCEDURE — 72148 MRI LUMBAR SPINE W/O DYE: CPT | Mod: 26,,, | Performed by: RADIOLOGY

## 2019-08-09 PROCEDURE — 72148 MRI LUMBAR SPINE WITHOUT CONTRAST: ICD-10-PCS | Mod: 26,,, | Performed by: RADIOLOGY

## 2019-08-09 PROCEDURE — 72148 MRI LUMBAR SPINE W/O DYE: CPT | Mod: TC

## 2019-08-09 NOTE — TELEPHONE ENCOUNTER
----- Message from Fiona Gonzales sent at 8/9/2019  8:32 AM CDT -----  Contact: June    [facility]    [phone]  Calling to concerning a drug interaction with HYDROcodone-acetaminophen (NORCO) 7.5-325 mg per tablet, tiZANidine (ZANAFLEX) 4 MG tablet and diazePAM (VALIUM) 5 MG tablet.  We are not able to fill the hydrocodone and diazepam without speaking with someone.  Pls call.

## 2019-08-09 NOTE — TELEPHONE ENCOUNTER
Pharmacy called about Norco, tizanidine and valium interaction, informed them that the MD is aware and monitoring the pt for potential interactions.

## 2019-08-15 ENCOUNTER — TELEPHONE (OUTPATIENT)
Dept: NEUROLOGY | Facility: CLINIC | Age: 54
End: 2019-08-15

## 2019-08-15 NOTE — TELEPHONE ENCOUNTER
Patient refused EMG.  She states that she has other medical bills that she is dealing with right now and can not afford the testing at this time.  She will call when she is ready.

## 2019-08-26 DIAGNOSIS — M54.42 CHRONIC MIDLINE LOW BACK PAIN WITH BILATERAL SCIATICA: ICD-10-CM

## 2019-08-26 DIAGNOSIS — G89.29 CHRONIC MIDLINE LOW BACK PAIN WITH BILATERAL SCIATICA: ICD-10-CM

## 2019-08-26 DIAGNOSIS — M54.41 CHRONIC MIDLINE LOW BACK PAIN WITH BILATERAL SCIATICA: ICD-10-CM

## 2019-08-26 RX ORDER — TIZANIDINE 4 MG/1
4 TABLET ORAL 3 TIMES DAILY PRN
Qty: 90 TABLET | Refills: 3 | Status: SHIPPED | OUTPATIENT
Start: 2019-08-26 | End: 2019-11-03 | Stop reason: SDUPTHER

## 2019-08-30 ENCOUNTER — PATIENT OUTREACH (OUTPATIENT)
Dept: ADMINISTRATIVE | Facility: OTHER | Age: 54
End: 2019-08-30

## 2019-09-03 ENCOUNTER — OFFICE VISIT (OUTPATIENT)
Dept: SPINE | Facility: CLINIC | Age: 54
End: 2019-09-03
Attending: ANESTHESIOLOGY
Payer: COMMERCIAL

## 2019-09-03 VITALS
TEMPERATURE: 99 F | WEIGHT: 147.25 LBS | SYSTOLIC BLOOD PRESSURE: 117 MMHG | RESPIRATION RATE: 18 BRPM | HEIGHT: 69 IN | BODY MASS INDEX: 21.81 KG/M2 | HEART RATE: 81 BPM | DIASTOLIC BLOOD PRESSURE: 66 MMHG

## 2019-09-03 DIAGNOSIS — M51.36 DDD (DEGENERATIVE DISC DISEASE), LUMBAR: ICD-10-CM

## 2019-09-03 DIAGNOSIS — M54.16 LUMBAR RADICULOPATHY: Primary | ICD-10-CM

## 2019-09-03 PROCEDURE — 99999 PR PBB SHADOW E&M-EST. PATIENT-LVL IV: ICD-10-PCS | Mod: PBBFAC,,, | Performed by: ANESTHESIOLOGY

## 2019-09-03 PROCEDURE — 99204 OFFICE O/P NEW MOD 45 MIN: CPT | Mod: S$GLB,,, | Performed by: ANESTHESIOLOGY

## 2019-09-03 PROCEDURE — 99204 PR OFFICE/OUTPT VISIT, NEW, LEVL IV, 45-59 MIN: ICD-10-PCS | Mod: S$GLB,,, | Performed by: ANESTHESIOLOGY

## 2019-09-03 PROCEDURE — 99999 PR PBB SHADOW E&M-EST. PATIENT-LVL IV: CPT | Mod: PBBFAC,,, | Performed by: ANESTHESIOLOGY

## 2019-09-03 NOTE — H&P (VIEW-ONLY)
Chronic Pain - New Consult    Referring Physician: Self, Aaareferral    Chief Complaint:   Chief Complaint   Patient presents with    Low-back Pain        SUBJECTIVE: Disclaimer: This note has been generated using voice-recognition software. There may be typographical errors that have been missed during proof-reading    Initial encounter:    Roxanne Templeton presents to the clinic for the evaluation of lower back pain. The pain started years ago insidiously and symptoms have been unchanged.    Brief history:  In treatment with Dr. Hung and has been on medication management    Pain Description:    The pain is located in the lower back area and radiates to the left lower extremity in the L4/5 distribution.      At BEST  4/10     At WORST  9/10 on the WORST day.      On average pain is rated as 6/10.     Today the pain is rated as 4/10    The pain is described as aching, numbing, sharp, shooting and throbbing      Symptoms interfere with daily activity and sleeping.     Exacerbating factors: Sitting, Standing, Laying, Bending, Walking, Night Time, Morning, Extension, Flexing and Getting out of bed/chair.      Mitigating factors medications and rest.     Patient denies urinary incontinence and bowel incontinence.  Patient denies any suicidal or homicidal ideations    Pain Medications:  Current:  Diclofenac  Venlafaxine  Hydrocodone 5/325 TID PRN  tizanidine    Tried in Past:  NSAIDs -Never  TCA -Never  SNRI -Never  Anti-convulsants -Never  Muscle Relaxants -Never  Opioids-Never    Physical Therapy/Home Exercise: yes  In the past without significant improvement, and patient has continued maintenance daily      report:  Reviewed and consistent with medication use as prescribed.    Pain Procedures: in the past - epidural injections in the past.  No relief 2011 and 2012 with interventional radiology    Chiropractor -never  Acupuncture - never  TENS unit -never  Spinal decompression -never  Joint replacement  -never    Imaging:   MRI lumbar spine 8/9/2019  EXAMINATION:  MRI LUMBAR SPINE WITHOUT CONTRAST    CLINICAL HISTORY:  Chronic low back pain.    TECHNIQUE:  Multiplanar, multisequence MR images were acquired from the thoracolumbar junction to the sacrum without contrast.    COMPARISON:  MRI lumbar spine from 11/12/2012, 10/22/2007.    FINDINGS:  Alignment: Normal.    Vertebrae: Normal marrow signal. No fracture.    Discs: Disc height loss and disc desiccation at the L3-L4 and L4-L5 levels.    Cord: Normal.  Conus terminates at T12.    Degenerative findings:    T12-L1,L1-L2, and L2-L3: No spinal canal stenosis or neural foraminal narrowing.    L3-L4: Diffuse disc bulge with superimposed central disc protrusion, mildly progressed in comparison with prior imaging, results in mild spinal canal stenosis, and mild bilateral neural foraminal narrowing.    L4-L5: Diffuse disc bulge with superimposed central disc extrusion, migrating inferiorly results in mild left and moderate right neural foraminal narrowing.  No significant spinal canal stenosis.    L5-S1: No spinal canal stenosis or neural foraminal narrowing.    Paraspinal muscles & soft tissues: Unremarkable.      Impression       Multilevel degenerative changes of the lumbar spine, worst at the levels of L3-L4 and L4-L5.  At L3-L4 there is mild spinal canal stenosis and mild bilateral neural foraminal narrowing, and at L4-L5 there is mild left and moderate right neural foraminal narrowing.    Electronically signed by resident: Saad Orosco  Date: 08/09/2019  Time: 10:11     Xray flex/ext lumbar spine 3/27/2018  EXAMINATION:  XR LUMBAR SPINE AP AND LAT WITH FLEX/EXT    CLINICAL HISTORY:  Low back pain    FINDINGS:  No fracture dislocation bone destruction seen.  There is mild DJD.  No instability is seen.      Impression              Past Medical History:   Diagnosis Date    Allergy     Chronic back pain     Chronic back pain     Hyperlipidemia      Past Surgical  History:   Procedure Laterality Date    COLONOSCOPY N/A 6/11/2013    Performed by Dustin [facility] at [facility] (4TH FLR)    FOOT SURGERY Left     toe    HYSTERECTOMY  1995    RICHARD (AUB), ovaries remain    TUBAL LIGATION       Social History     Socioeconomic History    Marital status:      Spouse name: Alec    Number of children: 2    Years of education: Not on file    Highest education level: Not on file   Occupational History    Occupation: Personal Care Manager     Employer: [employer]      Employer: [employer]    Social Needs    Financial resource strain: Not on file    Food insecurity:     Worry: Not on file     Inability: Not on file    Transportation needs:     Medical: Not on file     Non-medical: Not on file   Tobacco Use    Smoking status: Former Smoker     Packs/day: 0.50     Years: 30.00     Pack years: 15.00     Start date: 1/1/1981    Smokeless tobacco: Never Used   Substance and Sexual Activity    Alcohol use: No     Alcohol/week: 0.0 oz    Drug use: No    Sexual activity: Yes     Partners: Male     Comment: , together x 23 years, since 1995   Lifestyle    Physical activity:     Days per week: Not on file     Minutes per session: Not on file    Stress: Not on file   Relationships    Social connections:     Talks on phone: Not on file     Gets together: Not on file     Attends Rastafari service: Not on file     Active member of club or organization: Not on file     Attends meetings of clubs or organizations: Not on file     Relationship status: Not on file   Other Topics Concern    Not on file   Social History Narrative    Not on file     Family History   Problem Relation Age of Onset    Colon polyps Mother     Diabetes Neg Hx     Heart disease Neg Hx     Breast cancer Neg Hx     Hypertension Neg Hx     Ovarian cancer Neg Hx        Review of patient's allergies indicates:   Allergen Reactions    Dilaudid   [hydromorphone (bulk)]      Other reaction(s): Itching    Latex, natural rubber     Zithromax  [azithromycin]      Other reaction(s): Hives       Current Outpatient Medications   Medication Sig    atorvastatin (LIPITOR) 40 MG tablet Take 1 tablet (40 mg total) by mouth once daily.    calcium-vitamin D (CALCIUM-VITAMIN D) 500 mg(1,250mg) -200 unit per tablet Take 2 tablets by mouth 2 (two) times daily with meals.      HYDROcodone-acetaminophen (NORCO) 7.5-325 mg per tablet Take 1 tablet by mouth 3 (three) times daily as needed for Pain.    tiZANidine (ZANAFLEX) 4 MG tablet Take 1 tablet (4 mg total) by mouth 3 (three) times daily as needed.    venlafaxine (EFFEXOR-XR) 37.5 MG 24 hr capsule TAKE 1 CAPSULE (37.5 MG TOTAL) BY MOUTH ONCE DAILY. TAKE WITH  MG CAPSULE    diazePAM (VALIUM) 5 MG tablet Take 1 tablet (5 mg total) by mouth As instructed for Anxiety. Take 1 tablet half an hour before MRI. May repeat if necessary.    diclofenac (VOLTAREN) 75 MG EC tablet Take 1 tablet (75 mg total) by mouth 2 (two) times daily.    diphth,pertus,acell,,tetanus (BOOSTRIX) 2.5-8-5 Lf-mcg-Lf/0.5mL Syrg injection Inject into the muscle.     No current facility-administered medications for this visit.        REVIEW OF SYSTEMS:    GENERAL:  No weight loss, malaise or fevers.  HEENT:   No recent changes in vision or hearing  NECK:  Negative for lumps, no difficulty with swallowing.  RESPIRATORY:  Negative for cough, wheezing or shortness of breath, patient denies any recent URI.  CARDIOVASCULAR:  Negative for chest pain, leg swelling or palpitations.  GI:  Negative for abdominal discomfort, blood in stools or black stools or change in bowel habits.  MUSCULOSKELETAL:  See HPI.  SKIN:  Negative for lesions, rash, and itching.  PSYCH:  No mood disorder or recent psychosocial stressors.  Patients sleep is not disturbed secondary to pain.  HEMATOLOGY/LYMPHOLOGY:  Negative for prolonged bleeding, bruising easily or swollen  "nodes.  Patient is not currently taking any anti-coagulants  ENDO: No history of diabetes or thyroid dysfunction  NEURO:   No history of headaches, syncope, paralysis, seizures or tremors.  All other reviewed and negative other than HPI.    OBJECTIVE:    /66   Pulse 81   Temp 99.1 °F (37.3 °C)   Resp 18   Ht 5' 9" (1.753 m)   Wt 66.8 kg (147 lb 4.3 oz)   BMI 21.75 kg/m²     PHYSICAL EXAMINATION:    GENERAL: Well appearing, in no acute distress, alert and oriented x3.  PSYCH:  Mood and affect appropriate.  SKIN: Skin color, texture, turgor normal, no rashes or lesions.  HEAD/FACE:  Normocephalic, atraumatic. Cranial nerves grossly intact.  CV: RRR with palpation of the radial artery.  PULM: No evidence of respiratory difficulty, symmetric chest rise.  BACK: Straight leg raising in the sitting and supine positions is negative to radicular pain. There is pain with palpation over the facet joints of the lumbar spine bilaterally. There is decreased range of motion with extension to 15 degrees and flexion secondary to pain, and facet loading maneuvers cause reproducible pain.    EXTREMITIES: Peripheral joint ROM is full and pain free without obvious instability or laxity in all four extremities. No deformities, edema, or skin discoloration. Good capillary refill.  MUSCULOSKELETAL: Hip, and knee provocative maneuvers are negative.  There is  pain with palpation over the sacroiliac joints bilaterally.  There is no pain to palpation over the greater trochanteric bursa bilaterally.  FABERs test is positive.  FADIRs test is negative.   EHL on Right 5/5, EHL on Left EHL 5/5.  5/5 strength in right ankle with plantar and 4/5 dorsiflexion, 5/5 strength in left ankle with plantar and 4/5 dorsiflexion, 5/5 strength with right knee flexion extension, 5/5 strength with knee flexion extension on the left. 5/5 Hip flexion on right, 5/5 hip flexion on the left. .  No atrophy or tone abnormalities are noted.  NEURO: Bilateral " lower extremity coordination and muscle stretch reflexes are physiologic and symmetric.  Plantar response are downgoing. No clonus.  No loss of sensation ismal. noted.  GAIT: nor        Lab Results   Component Value Date    WBC 5.73 06/15/2019    HGB 12.2 06/15/2019    HCT 38.9 06/15/2019    MCV 85 06/15/2019     06/15/2019     BMP  Lab Results   Component Value Date     06/15/2019    K 4.4 06/15/2019     06/15/2019    CO2 21 (L) 06/15/2019    BUN 14 06/15/2019    CREATININE 0.8 06/15/2019    CALCIUM 9.2 06/15/2019    ANIONGAP 14 06/15/2019    ESTGFRAFRICA >60 06/15/2019    EGFRNONAA >60 06/15/2019     Lab Results   Component Value Date    HGBA1C 5.4 01/23/2014         ASSESSMENT: 54 y.o. year old female with pain, consistent with     Encounter Diagnoses   Name Primary?    Lumbar radiculopathy Yes    DDD (degenerative disc disease), lumbar        PLAN:   Schedule for bilateral L4/5 transforaminal epidural injections    F/u in 3 weeks with NP to determine response    Continue medications as per Dr. Hung    Continue home exercises    Depending on response to epidural injection, patient may benefit from surgical evaluation.    The above plan and management options were discussed at length with patient. Patient is in agreement with the above and verbalized understanding. It will be communicated with the referring physician via electronic record, fax, or mail.    Mikala David  09/03/2019

## 2019-09-03 NOTE — PROGRESS NOTES
Chronic Pain - New Consult    Referring Physician: Self, Aaareferral    Chief Complaint:   Chief Complaint   Patient presents with    Low-back Pain        SUBJECTIVE: Disclaimer: This note has been generated using voice-recognition software. There may be typographical errors that have been missed during proof-reading    Initial encounter:    Roxanne Templeton presents to the clinic for the evaluation of lower back pain. The pain started years ago insidiously and symptoms have been unchanged.    Brief history:  In treatment with Dr. Hung and has been on medication management    Pain Description:    The pain is located in the lower back area and radiates to the left lower extremity in the L4/5 distribution.      At BEST  4/10     At WORST  9/10 on the WORST day.      On average pain is rated as 6/10.     Today the pain is rated as 4/10    The pain is described as aching, numbing, sharp, shooting and throbbing      Symptoms interfere with daily activity and sleeping.     Exacerbating factors: Sitting, Standing, Laying, Bending, Walking, Night Time, Morning, Extension, Flexing and Getting out of bed/chair.      Mitigating factors medications and rest.     Patient denies urinary incontinence and bowel incontinence.  Patient denies any suicidal or homicidal ideations    Pain Medications:  Current:  Diclofenac  Venlafaxine  Hydrocodone 5/325 TID PRN  tizanidine    Tried in Past:  NSAIDs -Never  TCA -Never  SNRI -Never  Anti-convulsants -Never  Muscle Relaxants -Never  Opioids-Never    Physical Therapy/Home Exercise: yes  In the past without significant improvement, and patient has continued maintenance daily      report:  Reviewed and consistent with medication use as prescribed.    Pain Procedures: in the past - epidural injections in the past.  No relief 2011 and 2012 with interventional radiology    Chiropractor -never  Acupuncture - never  TENS unit -never  Spinal decompression -never  Joint replacement  -never    Imaging:   MRI lumbar spine 8/9/2019  EXAMINATION:  MRI LUMBAR SPINE WITHOUT CONTRAST    CLINICAL HISTORY:  Chronic low back pain.    TECHNIQUE:  Multiplanar, multisequence MR images were acquired from the thoracolumbar junction to the sacrum without contrast.    COMPARISON:  MRI lumbar spine from 11/12/2012, 10/22/2007.    FINDINGS:  Alignment: Normal.    Vertebrae: Normal marrow signal. No fracture.    Discs: Disc height loss and disc desiccation at the L3-L4 and L4-L5 levels.    Cord: Normal.  Conus terminates at T12.    Degenerative findings:    T12-L1,L1-L2, and L2-L3: No spinal canal stenosis or neural foraminal narrowing.    L3-L4: Diffuse disc bulge with superimposed central disc protrusion, mildly progressed in comparison with prior imaging, results in mild spinal canal stenosis, and mild bilateral neural foraminal narrowing.    L4-L5: Diffuse disc bulge with superimposed central disc extrusion, migrating inferiorly results in mild left and moderate right neural foraminal narrowing.  No significant spinal canal stenosis.    L5-S1: No spinal canal stenosis or neural foraminal narrowing.    Paraspinal muscles & soft tissues: Unremarkable.      Impression       Multilevel degenerative changes of the lumbar spine, worst at the levels of L3-L4 and L4-L5.  At L3-L4 there is mild spinal canal stenosis and mild bilateral neural foraminal narrowing, and at L4-L5 there is mild left and moderate right neural foraminal narrowing.    Electronically signed by resident: Saad Orosco  Date: 08/09/2019  Time: 10:11     Xray flex/ext lumbar spine 3/27/2018  EXAMINATION:  XR LUMBAR SPINE AP AND LAT WITH FLEX/EXT    CLINICAL HISTORY:  Low back pain    FINDINGS:  No fracture dislocation bone destruction seen.  There is mild DJD.  No instability is seen.      Impression              Past Medical History:   Diagnosis Date    Allergy     Chronic back pain     Chronic back pain     Hyperlipidemia      Past Surgical  History:   Procedure Laterality Date    COLONOSCOPY N/A 6/11/2013    Performed by [facility]in [facility] MD at [facility] (4TH FLR)    FOOT SURGERY Left     toe    HYSTERECTOMY  1995    RICHARD (AUB), ovaries remain    TUBAL LIGATION       Social History     Socioeconomic History    Marital status:      Spouse name: Alec    Number of children: 2    Years of education: Not on file    Highest education level: Not on file   Occupational History    Occupation: Personal Care Manager     Employer: [employer]      Employer: [employer]    Social Needs    Financial resource strain: Not on file    Food insecurity:     Worry: Not on file     Inability: Not on file    Transportation needs:     Medical: Not on file     Non-medical: Not on file   Tobacco Use    Smoking status: Former Smoker     Packs/day: 0.50     Years: 30.00     Pack years: 15.00     Start date: 1/1/1981    Smokeless tobacco: Never Used   Substance and Sexual Activity    Alcohol use: No     Alcohol/week: 0.0 oz    Drug use: No    Sexual activity: Yes     Partners: Male     Comment: , together x 23 years, since 1995   Lifestyle    Physical activity:     Days per week: Not on file     Minutes per session: Not on file    Stress: Not on file   Relationships    Social connections:     Talks on phone: Not on file     Gets together: Not on file     Attends Sabianist service: Not on file     Active member of club or organization: Not on file     Attends meetings of clubs or organizations: Not on file     Relationship status: Not on file   Other Topics Concern    Not on file   Social History Narrative    Not on file     Family History   Problem Relation Age of Onset    Colon polyps Mother     Diabetes Neg Hx     Heart disease Neg Hx     Breast cancer Neg Hx     Hypertension Neg Hx     Ovarian cancer Neg Hx        Review of patient's allergies indicates:   Allergen Reactions    Dilaudid   [hydromorphone (bulk)]      Other reaction(s): Itching    Latex, natural rubber     Zithromax  [azithromycin]      Other reaction(s): Hives       Current Outpatient Medications   Medication Sig    atorvastatin (LIPITOR) 40 MG tablet Take 1 tablet (40 mg total) by mouth once daily.    calcium-vitamin D (CALCIUM-VITAMIN D) 500 mg(1,250mg) -200 unit per tablet Take 2 tablets by mouth 2 (two) times daily with meals.      HYDROcodone-acetaminophen (NORCO) 7.5-325 mg per tablet Take 1 tablet by mouth 3 (three) times daily as needed for Pain.    tiZANidine (ZANAFLEX) 4 MG tablet Take 1 tablet (4 mg total) by mouth 3 (three) times daily as needed.    venlafaxine (EFFEXOR-XR) 37.5 MG 24 hr capsule TAKE 1 CAPSULE (37.5 MG TOTAL) BY MOUTH ONCE DAILY. TAKE WITH  MG CAPSULE    diazePAM (VALIUM) 5 MG tablet Take 1 tablet (5 mg total) by mouth As instructed for Anxiety. Take 1 tablet half an hour before MRI. May repeat if necessary.    diclofenac (VOLTAREN) 75 MG EC tablet Take 1 tablet (75 mg total) by mouth 2 (two) times daily.    diphth,pertus,acell,,tetanus (BOOSTRIX) 2.5-8-5 Lf-mcg-Lf/0.5mL Syrg injection Inject into the muscle.     No current facility-administered medications for this visit.        REVIEW OF SYSTEMS:    GENERAL:  No weight loss, malaise or fevers.  HEENT:   No recent changes in vision or hearing  NECK:  Negative for lumps, no difficulty with swallowing.  RESPIRATORY:  Negative for cough, wheezing or shortness of breath, patient denies any recent URI.  CARDIOVASCULAR:  Negative for chest pain, leg swelling or palpitations.  GI:  Negative for abdominal discomfort, blood in stools or black stools or change in bowel habits.  MUSCULOSKELETAL:  See HPI.  SKIN:  Negative for lesions, rash, and itching.  PSYCH:  No mood disorder or recent psychosocial stressors.  Patients sleep is not disturbed secondary to pain.  HEMATOLOGY/LYMPHOLOGY:  Negative for prolonged bleeding, bruising easily or swollen  "nodes.  Patient is not currently taking any anti-coagulants  ENDO: No history of diabetes or thyroid dysfunction  NEURO:   No history of headaches, syncope, paralysis, seizures or tremors.  All other reviewed and negative other than HPI.    OBJECTIVE:    /66   Pulse 81   Temp 99.1 °F (37.3 °C)   Resp 18   Ht 5' 9" (1.753 m)   Wt 66.8 kg (147 lb 4.3 oz)   BMI 21.75 kg/m²     PHYSICAL EXAMINATION:    GENERAL: Well appearing, in no acute distress, alert and oriented x3.  PSYCH:  Mood and affect appropriate.  SKIN: Skin color, texture, turgor normal, no rashes or lesions.  HEAD/FACE:  Normocephalic, atraumatic. Cranial nerves grossly intact.  CV: RRR with palpation of the radial artery.  PULM: No evidence of respiratory difficulty, symmetric chest rise.  BACK: Straight leg raising in the sitting and supine positions is negative to radicular pain. There is pain with palpation over the facet joints of the lumbar spine bilaterally. There is decreased range of motion with extension to 15 degrees and flexion secondary to pain, and facet loading maneuvers cause reproducible pain.    EXTREMITIES: Peripheral joint ROM is full and pain free without obvious instability or laxity in all four extremities. No deformities, edema, or skin discoloration. Good capillary refill.  MUSCULOSKELETAL: Hip, and knee provocative maneuvers are negative.  There is  pain with palpation over the sacroiliac joints bilaterally.  There is no pain to palpation over the greater trochanteric bursa bilaterally.  FABERs test is positive.  FADIRs test is negative.   EHL on Right 5/5, EHL on Left EHL 5/5.  5/5 strength in right ankle with plantar and 4/5 dorsiflexion, 5/5 strength in left ankle with plantar and 4/5 dorsiflexion, 5/5 strength with right knee flexion extension, 5/5 strength with knee flexion extension on the left. 5/5 Hip flexion on right, 5/5 hip flexion on the left. .  No atrophy or tone abnormalities are noted.  NEURO: Bilateral " lower extremity coordination and muscle stretch reflexes are physiologic and symmetric.  Plantar response are downgoing. No clonus.  No loss of sensation ismal. noted.  GAIT: nor        Lab Results   Component Value Date    WBC 5.73 06/15/2019    HGB 12.2 06/15/2019    HCT 38.9 06/15/2019    MCV 85 06/15/2019     06/15/2019     BMP  Lab Results   Component Value Date     06/15/2019    K 4.4 06/15/2019     06/15/2019    CO2 21 (L) 06/15/2019    BUN 14 06/15/2019    CREATININE 0.8 06/15/2019    CALCIUM 9.2 06/15/2019    ANIONGAP 14 06/15/2019    ESTGFRAFRICA >60 06/15/2019    EGFRNONAA >60 06/15/2019     Lab Results   Component Value Date    HGBA1C 5.4 01/23/2014         ASSESSMENT: 54 y.o. year old female with pain, consistent with     Encounter Diagnoses   Name Primary?    Lumbar radiculopathy Yes    DDD (degenerative disc disease), lumbar        PLAN:   Schedule for bilateral L4/5 transforaminal epidural injections    F/u in 3 weeks with NP to determine response    Continue medications as per Dr. Hung    Continue home exercises    Depending on response to epidural injection, patient may benefit from surgical evaluation.    The above plan and management options were discussed at length with patient. Patient is in agreement with the above and verbalized understanding. It will be communicated with the referring physician via electronic record, fax, or mail.    Mikala David  09/03/2019

## 2019-09-04 ENCOUNTER — PATIENT MESSAGE (OUTPATIENT)
Dept: INTERNAL MEDICINE | Facility: CLINIC | Age: 54
End: 2019-09-04

## 2019-09-05 ENCOUNTER — OFFICE VISIT (OUTPATIENT)
Dept: INTERNAL MEDICINE | Facility: CLINIC | Age: 54
End: 2019-09-05
Payer: COMMERCIAL

## 2019-09-05 ENCOUNTER — DOCUMENTATION ONLY (OUTPATIENT)
Dept: INTERNAL MEDICINE | Facility: CLINIC | Age: 54
End: 2019-09-05

## 2019-09-05 VITALS
WEIGHT: 143 LBS | HEIGHT: 69 IN | BODY MASS INDEX: 21.18 KG/M2 | DIASTOLIC BLOOD PRESSURE: 78 MMHG | SYSTOLIC BLOOD PRESSURE: 114 MMHG

## 2019-09-05 DIAGNOSIS — Z00.00 HEALTH CARE MAINTENANCE: ICD-10-CM

## 2019-09-05 DIAGNOSIS — F33.1 MODERATE EPISODE OF RECURRENT MAJOR DEPRESSIVE DISORDER: Primary | ICD-10-CM

## 2019-09-05 PROCEDURE — 99999 PR PBB SHADOW E&M-EST. PATIENT-LVL III: CPT | Mod: PBBFAC,,, | Performed by: INTERNAL MEDICINE

## 2019-09-05 PROCEDURE — 90471 FLU VACCINE (QUAD) GREATER THAN OR EQUAL TO 3YO PRESERVATIVE FREE IM: ICD-10-PCS | Mod: S$GLB,,, | Performed by: INTERNAL MEDICINE

## 2019-09-05 PROCEDURE — 90686 IIV4 VACC NO PRSV 0.5 ML IM: CPT | Mod: S$GLB,,, | Performed by: INTERNAL MEDICINE

## 2019-09-05 PROCEDURE — 99214 PR OFFICE/OUTPT VISIT, EST, LEVL IV, 30-39 MIN: ICD-10-PCS | Mod: 25,S$GLB,, | Performed by: INTERNAL MEDICINE

## 2019-09-05 PROCEDURE — 99999 PR PBB SHADOW E&M-EST. PATIENT-LVL III: ICD-10-PCS | Mod: PBBFAC,,, | Performed by: INTERNAL MEDICINE

## 2019-09-05 PROCEDURE — 90686 FLU VACCINE (QUAD) GREATER THAN OR EQUAL TO 3YO PRESERVATIVE FREE IM: ICD-10-PCS | Mod: S$GLB,,, | Performed by: INTERNAL MEDICINE

## 2019-09-05 PROCEDURE — 90471 IMMUNIZATION ADMIN: CPT | Mod: S$GLB,,, | Performed by: INTERNAL MEDICINE

## 2019-09-05 PROCEDURE — 3008F PR BODY MASS INDEX (BMI) DOCUMENTED: ICD-10-PCS | Mod: CPTII,S$GLB,, | Performed by: INTERNAL MEDICINE

## 2019-09-05 PROCEDURE — 3008F BODY MASS INDEX DOCD: CPT | Mod: CPTII,S$GLB,, | Performed by: INTERNAL MEDICINE

## 2019-09-05 PROCEDURE — 99214 OFFICE O/P EST MOD 30 MIN: CPT | Mod: 25,S$GLB,, | Performed by: INTERNAL MEDICINE

## 2019-09-05 RX ORDER — VENLAFAXINE HYDROCHLORIDE 150 MG/1
150 CAPSULE, EXTENDED RELEASE ORAL DAILY
Qty: 30 CAPSULE | Refills: 11 | COMMUNITY
Start: 2019-09-05 | End: 2020-03-24 | Stop reason: SDUPTHER

## 2019-09-05 RX ORDER — VENLAFAXINE HYDROCHLORIDE 75 MG/1
75 CAPSULE, EXTENDED RELEASE ORAL DAILY
Qty: 30 CAPSULE | Refills: 11 | Status: SHIPPED | OUTPATIENT
Start: 2019-09-05 | End: 2020-03-25

## 2019-09-05 NOTE — MEDICAL/APP STUDENT
"Subjective:       Patient ID: Roxanne Templeton is a 54 y.o. female.    Chief Complaint: Depression (reports of chronic depression (about 5 years), patient feels overwhlemed with certain things and feeling like everything has to be "perfect". denies anxiety and or panic attacks. ) and Shoulder Pain (reports of unusual feeling on R shoulder blade (with or w/o wearing bra), feels crawling beneath skin. )    Mrs Templeton is a 55 yo female who presents today for feelings of depression. She states that she has felt this way for several years but it has worsened int he last year due to the loss of several family members. She feels "ready to go at any time," but has no active plans to hurt herself or others. She has been unable to find a job due to chronic back pain. She only sleeps 2-3 hours per night. She has trouble falling asleep, and then wakes early. She has had fatigue. She has lost interest in her hobbies. She has trouble focusing. She has had decreased appetite. Despite cooking meals for her family, she only eats 2 hamburger buns nightly and does not eat during the day. She has no desire to eat food. She sees rehab medicine for her back pain and takes effexor 187.5 daily. She denies ETOH/drug use. She smokes 1/2-3/4 packs/day for 38 years.     Review of Systems   Constitutional: Positive for activity change, appetite change and fatigue. Negative for fever and unexpected weight change.   Respiratory: Negative for shortness of breath and stridor.    Cardiovascular: Positive for leg swelling. Negative for chest pain.   Musculoskeletal: Positive for arthralgias and back pain.   Psychiatric/Behavioral: Positive for decreased concentration, dysphoric mood and sleep disturbance. Negative for self-injury and suicidal ideas.       Objective:      Physical Exam   Psychiatric: Her speech is normal. She is slowed. She is not actively hallucinating. She exhibits a depressed mood. She is attentive.       Assessment:       1. " Moderate episode of recurrent major depressive disorder    2. Health care maintenance        Plan:       1. Increase effexor to 225mg daily for major depressive disorder episode  2. FU in 6 weeks for evaluation of depression

## 2019-09-05 NOTE — PROGRESS NOTES
"Subjective:       Patient ID: Roxanne Templeton is a 54 y.o. female.    Chief Complaint: Depression (reports of chronic depression (about 5 years), patient feels overwhlemed with certain things and feeling like everything has to be "perfect". denies anxiety and or panic attacks. ) and Shoulder Pain (reports of unusual feeling on R shoulder blade (with or w/o wearing bra), feels crawling beneath skin. )    HPI   53 yo F here for evaluation of depression.  effexor xr 187.5mg    Also emailed w question about disability for back and knee pain. Worse since fell walking to mmg.   She is not working currently.     Seeing Dr. Hung and Dr. David for pain.  Review of Systems   Constitutional: Negative for fever.   HENT: Negative.    Eyes: Negative.    Respiratory: Negative for shortness of breath.    Cardiovascular: Negative for chest pain and leg swelling.   Gastrointestinal: Negative for abdominal pain, diarrhea, nausea and vomiting.   Genitourinary: Negative.    Musculoskeletal: Negative for arthralgias.   Skin: Negative for rash.   Psychiatric/Behavioral: Negative.        Objective:   /78 (BP Location: Right arm, Patient Position: Sitting, BP Method: Medium (Manual))   Ht 5' 9" (1.753 m)   Wt 64.9 kg (143 lb)   BMI 21.12 kg/m²      Physical Exam    Assessment:       1. Moderate episode of recurrent major depressive disorder    2. Health care maintenance        Plan:       Roxanne was seen today for depression and shoulder pain.    Diagnoses and all orders for this visit:    Moderate episode of recurrent major depressive disorder  -     Increase from 187.5mg to 225mg daily  venlafaxine (EFFEXOR-XR) 75 MG 24 hr capsule; Take 1 capsule (75 mg total) by mouth once daily. With 150mg for total 225mg   Encouraged counseling but she declines    Health care maintenance  -     Fecal Immunochemical Test (iFOBT); Future    Other orders  -     Influenza - Quadrivalent (3 years & older) (PF)          "

## 2019-09-07 ENCOUNTER — LAB VISIT (OUTPATIENT)
Dept: LAB | Facility: HOSPITAL | Age: 54
End: 2019-09-07
Attending: INTERNAL MEDICINE
Payer: COMMERCIAL

## 2019-09-07 DIAGNOSIS — Z12.11 ENCOUNTER FOR FECAL IMMUNOCHEMICAL TEST SCREENING: ICD-10-CM

## 2019-09-07 PROCEDURE — 82274 ASSAY TEST FOR BLOOD FECAL: CPT

## 2019-09-09 ENCOUNTER — PATIENT MESSAGE (OUTPATIENT)
Dept: PHYSICAL MEDICINE AND REHAB | Facility: CLINIC | Age: 54
End: 2019-09-09

## 2019-09-09 ENCOUNTER — PATIENT MESSAGE (OUTPATIENT)
Dept: INTERNAL MEDICINE | Facility: CLINIC | Age: 54
End: 2019-09-09

## 2019-09-09 DIAGNOSIS — M54.41 CHRONIC MIDLINE LOW BACK PAIN WITH BILATERAL SCIATICA: ICD-10-CM

## 2019-09-09 DIAGNOSIS — G89.29 CHRONIC MIDLINE LOW BACK PAIN WITH BILATERAL SCIATICA: ICD-10-CM

## 2019-09-09 DIAGNOSIS — M54.42 CHRONIC MIDLINE LOW BACK PAIN WITH BILATERAL SCIATICA: ICD-10-CM

## 2019-09-09 RX ORDER — HYDROCODONE BITARTRATE AND ACETAMINOPHEN 7.5; 325 MG/1; MG/1
1 TABLET ORAL 3 TIMES DAILY PRN
Qty: 90 TABLET | Refills: 0 | Status: SHIPPED | OUTPATIENT
Start: 2019-09-09 | End: 2019-10-07 | Stop reason: SDUPTHER

## 2019-09-09 RX ORDER — HYDROCODONE BITARTRATE AND ACETAMINOPHEN 7.5; 325 MG/1; MG/1
1 TABLET ORAL 3 TIMES DAILY PRN
Qty: 90 TABLET | Refills: 0 | OUTPATIENT
Start: 2019-09-09 | End: 2019-10-09

## 2019-09-09 NOTE — TELEPHONE ENCOUNTER
Last Rx refill-----08/08/19  Last office visit-06/03/19  Next office visit--10/08/19        ----- Message from Nataliia Victoria sent at 9/9/2019  2:54 PM CDT -----  Contact:   Pt   [phone]  Rx Refill/Request     Is this a Refill or New Rx:    Refill    Rx Name and Strength:       hydrocodone percocet   Preferred Pharmacy with phone number:   [facility]  [address] [facility] [phone]    Communication Preference:  Phone   Additional Information:

## 2019-09-10 ENCOUNTER — PATIENT MESSAGE (OUTPATIENT)
Dept: INTERNAL MEDICINE | Facility: CLINIC | Age: 54
End: 2019-09-10

## 2019-09-10 ENCOUNTER — PATIENT MESSAGE (OUTPATIENT)
Dept: PHYSICAL MEDICINE AND REHAB | Facility: CLINIC | Age: 54
End: 2019-09-10

## 2019-09-11 ENCOUNTER — PATIENT MESSAGE (OUTPATIENT)
Dept: INTERNAL MEDICINE | Facility: CLINIC | Age: 54
End: 2019-09-11

## 2019-09-13 LAB — HEMOCCULT STL QL IA: NEGATIVE

## 2019-09-26 ENCOUNTER — HOSPITAL ENCOUNTER (OUTPATIENT)
Facility: OTHER | Age: 54
Discharge: HOME | End: 2019-09-26
Attending: ANESTHESIOLOGY | Admitting: ANESTHESIOLOGY
Payer: COMMERCIAL

## 2019-09-26 ENCOUNTER — PATIENT MESSAGE (OUTPATIENT)
Dept: PAIN MEDICINE | Facility: OTHER | Age: 54
End: 2019-09-26

## 2019-09-26 VITALS
SYSTOLIC BLOOD PRESSURE: 116 MMHG | RESPIRATION RATE: 16 BRPM | DIASTOLIC BLOOD PRESSURE: 70 MMHG | OXYGEN SATURATION: 98 % | TEMPERATURE: 98 F | HEART RATE: 65 BPM

## 2019-09-26 DIAGNOSIS — M54.16 LUMBAR RADICULOPATHY: Primary | ICD-10-CM

## 2019-09-26 DIAGNOSIS — G89.4 CHRONIC PAIN SYNDROME: ICD-10-CM

## 2019-09-26 DIAGNOSIS — G89.29 CHRONIC PAIN: ICD-10-CM

## 2019-09-26 PROCEDURE — 63600175 PHARM REV CODE 636 W HCPCS: Performed by: ANESTHESIOLOGY

## 2019-09-26 PROCEDURE — 64483 PR EPIDURAL INJ, ANES/STEROID, TRANSFORAMINAL, LUMB/SACR, SNGL LEVL: ICD-10-PCS | Mod: 50,,, | Performed by: ANESTHESIOLOGY

## 2019-09-26 PROCEDURE — 25500020 PHARM REV CODE 255: Performed by: ANESTHESIOLOGY

## 2019-09-26 PROCEDURE — 64483 NJX AA&/STRD TFRM EPI L/S 1: CPT | Mod: 50 | Performed by: ANESTHESIOLOGY

## 2019-09-26 PROCEDURE — 64483 NJX AA&/STRD TFRM EPI L/S 1: CPT | Mod: 50,,, | Performed by: ANESTHESIOLOGY

## 2019-09-26 PROCEDURE — 99152 MOD SED SAME PHYS/QHP 5/>YRS: CPT | Mod: ,,, | Performed by: ANESTHESIOLOGY

## 2019-09-26 PROCEDURE — 25000003 PHARM REV CODE 250: Performed by: ANESTHESIOLOGY

## 2019-09-26 PROCEDURE — 99152 PR MOD CONSCIOUS SEDATION, SAME PHYS, 5+ YRS, FIRST 15 MIN: ICD-10-PCS | Mod: ,,, | Performed by: ANESTHESIOLOGY

## 2019-09-26 RX ORDER — MIDAZOLAM HYDROCHLORIDE 1 MG/ML
INJECTION INTRAMUSCULAR; INTRAVENOUS
Status: DISCONTINUED | OUTPATIENT
Start: 2019-09-26 | End: 2019-09-26 | Stop reason: HOSPADM

## 2019-09-26 RX ORDER — FENTANYL CITRATE 50 UG/ML
INJECTION, SOLUTION INTRAMUSCULAR; INTRAVENOUS
Status: DISCONTINUED | OUTPATIENT
Start: 2019-09-26 | End: 2019-09-26 | Stop reason: HOSPADM

## 2019-09-26 RX ORDER — SODIUM CHLORIDE 9 MG/ML
500 INJECTION, SOLUTION INTRAVENOUS CONTINUOUS
Status: DISCONTINUED | OUTPATIENT
Start: 2019-09-26 | End: 2019-09-26 | Stop reason: HOSPADM

## 2019-09-26 RX ORDER — LIDOCAINE HYDROCHLORIDE 10 MG/ML
INJECTION INFILTRATION; PERINEURAL
Status: DISCONTINUED | OUTPATIENT
Start: 2019-09-26 | End: 2019-09-26 | Stop reason: HOSPADM

## 2019-09-26 RX ORDER — BUPIVACAINE HYDROCHLORIDE 2.5 MG/ML
INJECTION, SOLUTION EPIDURAL; INFILTRATION; INTRACAUDAL
Status: DISCONTINUED | OUTPATIENT
Start: 2019-09-26 | End: 2019-09-26 | Stop reason: HOSPADM

## 2019-09-26 RX ORDER — DEXAMETHASONE SODIUM PHOSPHATE 10 MG/ML
INJECTION INTRAMUSCULAR; INTRAVENOUS
Status: DISCONTINUED | OUTPATIENT
Start: 2019-09-26 | End: 2019-09-26 | Stop reason: HOSPADM

## 2019-09-26 NOTE — INTERVAL H&P NOTE
The patient has been examined and the H&P has been reviewed:    I concur with the findings and no changes have occurred since H&P was written.    Anesthesia/Surgery risks, benefits and alternative options discussed and understood by patient/family.          Active Hospital Problems    Diagnosis  POA    Chronic pain [G89.29]  Yes      Resolved Hospital Problems   No resolved problems to display.     HPI  Patient presenting for Procedure(s) (LRB):  INJECTION, STEROID, EPIDURAL, TRANSFORAMINAL APPROACH (Bilateral)     Patient on Anti-coagulation No    No health changes since previous encounter    Past Medical History:   Diagnosis Date    Allergy     Chronic back pain     Chronic back pain     Hyperlipidemia      Past Surgical History:   Procedure Laterality Date    FOOT SURGERY Left     toe    HYSTERECTOMY  1995    RICHARD (AUB), ovaries remain    TUBAL LIGATION       Review of patient's allergies indicates:   Allergen Reactions    Dilaudid  [hydromorphone (bulk)]      Other reaction(s): Itching    Latex, natural rubber     Zithromax  [azithromycin]      Other reaction(s): Hives      Current Facility-Administered Medications   Medication    0.9%  NaCl infusion       PMHx, PSHx, Allergies, Medications reviewed in epic    ROS negative except pain complaints in HPI    OBJECTIVE:    /68 (BP Location: Right arm, Patient Position: Lying)   Pulse 70   Temp 98.1 °F (36.7 °C) (Oral)   Resp 18   SpO2 97%     PHYSICAL EXAMINATION:    GENERAL: Well appearing, in no acute distress, alert and oriented x3.  PSYCH:  Mood and affect appropriate.  SKIN: Skin color, texture, turgor normal, no rashes or lesions which will impact the procedure.  CV: RRR with palpation of the radial artery.  PULM: No evidence of respiratory difficulty, symmetric chest rise. Clear to auscultation.  NEURO: Cranial nerves grossly intact.    Plan:    Proceed with procedure as planned Procedure(s) (LRB):  INJECTION, STEROID, EPIDURAL,  TRANSFORAMINAL APPROACH (Bilateral)    Robe Gimenez MD  09/26/2019

## 2019-09-26 NOTE — DISCHARGE INSTRUCTIONS
Thank you for allowing us to care for you today. You may receive a survey about the care we provided. Your feedback is valuable and helps us provide excellent care throughout the community.     Home Care Instructions for Pain Management:    1. DIET:   You may resume your normal diet today.   2. BATHING:   You may shower with luke warm water. No tub baths or anything that will soak injection sites under water for the next 24 hours.  3. DRESSING:   You may remove your bandage today.   4. ACTIVITY LEVEL:   You may resume your normal activities 24 hrs after your procedure. Nothing strenuous today.  5. MEDICATIONS:   You may resume your normal medications today. To restart blood thinners, ask your doctor.  6. DRIVING    If you have received any sedatives by mouth today, you may not drive for 12 hours.    If you have received any sedation through your IV, you may not drive for 24 hrs.   7. SPECIAL INSTRUCTIONS:   No heat to the injection site for 24 hrs including, hot bath or shower, heating pad, moist heat, or hot tubs.    Use ice pack to injection site for any pain or discomfort.  Apply ice packs for 20 minute intervals as needed.    IF you have diabetes, be sure to monitor your blood sugar more closely. IF your injection contained steroids your blood sugar levels may become higher than normal.    If you are still having pain upon discharge:  Your pain may improve over the next 48 hours. The anesthetic (numbing medication) works immediately to 48 hours. IF your injection contained a steroid (anti-inflammatory medication), it takes approximately 3 days to start feeling relief and 7-10 days to see your greatest results from the medication. It is possible you may need subsequent injections. This would be discussed at your follow up appointment with pain management or your referring doctor.    Please call the PAIN MANAGEMENT office at [phone] or ON CALL pager at [phone] if you experienced any:   -Weakness or  loss of sensation  -Fever > 101.5  -Pain uncontrolled with oral medications   -Persistent nausea, vomiting, or diarrhea  -Redness or drainage from the injection sites, or any other worrisome concerns.   If physician on call was not reached or could not communicate with our office for any reason please go to the nearest emergency department.    Recovery After Procedural Sedation (Adult)  You have been given medicine by vein to make you sleep during your surgery. This may have included both a pain medicine and sleeping medicine. Most of the effects have worn off. But you may still have some drowsiness for the next 6 to 8 hours.  Home care  Follow these guidelines when you get home:  · For the next 8 hours, you should be watched by a responsible adult. This person should make sure your condition is not getting worse.  · Don't drink any alcohol for the next 24 hours.  · Don't drive, operate dangerous machinery, or make important business or personal decisions during the next 24 hours.  Note: Your healthcare provider may tell you not to take any medicine by mouth for pain or sleep in the next 4 hours. These medicines may react with the medicines you were given in the hospital. This could cause a much stronger response than usual.  Follow-up care  Follow up with your healthcare provider if you are not alert and back to your usual level of activity within 12 hours.  When to seek medical advice  Call your healthcare provider right away if any of these occur:  · Drowsiness gets worse  · Weakness or dizziness gets worse  · Repeated vomiting  · You can't be awakened   Date Last Reviewed: 10/18/2016  © 0032-9748 [employer], [email or web address]. [address] [place] [address], [place] [address]. All rights reserved. This information is not intended as a substitute for professional medical care. Always follow your healthcare professional's instructions.

## 2019-09-26 NOTE — DISCHARGE SUMMARY
Discharge Note  Short Stay      SUMMARY     Admit Date: 9/26/2019    Attending Physician: Mikala David      Discharge Physician: Mikala David      Discharge Date: 9/26/2019 9:13 AM    Procedure(s) (LRB):  INJECTION, STEROID, EPIDURAL, TRANSFORAMINAL APPROACH (Bilateral)    Final Diagnosis: Lumbar radiculopathy [M54.16]    Disposition: Home or self care    Patient Instructions:   Current Discharge Medication List      CONTINUE these medications which have NOT CHANGED    Details   atorvastatin (LIPITOR) 40 MG tablet Take 1 tablet (40 mg total) by mouth once daily.  Qty: 90 tablet, Refills: 3    Associated Diagnoses: Mixed hyperlipidemia      calcium-vitamin D (CALCIUM-VITAMIN D) 500 mg(1,250mg) -200 unit per tablet Take 2 tablets by mouth 2 (two) times daily with meals.        diazePAM (VALIUM) 5 MG tablet Take 1 tablet (5 mg total) by mouth As instructed for Anxiety. Take 1 tablet half an hour before MRI. May repeat if necessary.  Qty: 2 tablet, Refills: 0      diclofenac (VOLTAREN) 75 MG EC tablet Take 1 tablet (75 mg total) by mouth 2 (two) times daily.  Qty: 60 tablet, Refills: 4    Associated Diagnoses: Chronic midline low back pain with bilateral sciatica      diphth,pertus,acell,,tetanus (BOOSTRIX) 2.5-8-5 Lf-mcg-Lf/0.5mL Syrg injection Inject into the muscle.  Qty: 0.5 mL, Refills: 0      HYDROcodone-acetaminophen (NORCO) 7.5-325 mg per tablet Take 1 tablet by mouth 3 (three) times daily as needed for Pain.  Qty: 90 tablet, Refills: 0    Comments: Medically necessary for more than 7 days due to chronic pain.  Associated Diagnoses: Chronic midline low back pain with bilateral sciatica      tiZANidine (ZANAFLEX) 4 MG tablet Take 1 tablet (4 mg total) by mouth 3 (three) times daily as needed.  Qty: 90 tablet, Refills: 3    Comments: Date correction. Pls cancel previous prescription.  Associated Diagnoses: Chronic midline low back pain with bilateral sciatica      !! venlafaxine (EFFEXOR-XR) 150 MG Cp24  Take 1 capsule (150 mg total) by mouth once daily.  Qty: 30 capsule, Refills: 11    Associated Diagnoses: Moderate episode of recurrent major depressive disorder      !! venlafaxine (EFFEXOR-XR) 75 MG 24 hr capsule Take 1 capsule (75 mg total) by mouth once daily. With 150mg for total 225mg  Qty: 30 capsule, Refills: 11    Associated Diagnoses: Moderate episode of recurrent major depressive disorder       !! - Potential duplicate medications found. Please discuss with provider.              Discharge Diagnosis: Lumbar radiculopathy [M54.16]  Condition on Discharge: Stable with no complications to procedure   Diet on Discharge: Same as before.  Activity: as per instruction sheet.  Discharge to: Home with a responsible adult.  Follow up: 2-4 weeks       Please call my office or pager at [phone] if experienced any weakness or loss of sensation, fever > 101.5, pain uncontrolled with oral medications, persistent nausea/vomiting/or diarrhea, redness or drainage from the incisions, or any other worrisome concerns. If physician on call was not reached or could not communicate with our office for any reason please go to the nearest emergency department

## 2019-09-26 NOTE — OP NOTE
INFORMED CONSENT: The procedure, risks, benefits and options were discussed with patient. There are no contraindications to the procedure. The patient expressed understanding and agreed to proceed. The personnel performing the procedure was discussed.    09/26/2019    Surgeon: Mikala David MD    Assistants:  Robe Gimenez MD PGY-3  I was present and supervising all critical portions of the procedure        PROCEDURE:  Bilateral  L5/S1  1) Left  L5/S1 TRANSFORAMINAL EPIDURAL STEROID INJECTION  2) Right  L5/S1 TRANSFORAMINAL EPIDURAL STEROID INJECTION      Pre Procedure diagnosis:  Bilateral L5/S1 Lumbar radiculopathy [M54.16]    Post-Procedure diagnosis:   same    Complications: None    Specimens: None      Indications: review of imaging revealed lumbarilization of the sacrum and review of the MRI imaging revealed majority of the pathology at the L5/S1 and over the lumbarilized segment.  The true L4/5 counting from the 12th rib would be above the pathology so the level of the injection was modified.    DESCRIPTION OF PROCEDURE: The patient was brought to the procedure room. IV access was obtained prior to the procedure. The patient was positioned prone on the fluoroscopy table. Continuous hemodynamic monitoring was initiated including blood pressure, EKG, and pulse oximetry. . The skin was prepped with chlorhexidine and draped in a sterile fashion. Skin anesthesia was achieved using a total of 10mL of lidocaine, 5mL over each respective injection site.     The  L5/S1 transforaminal spaces were identified with fluoroscopy in the  AP, oblique, and lateral views.  A 22 gauge spinal quinke needle was then advanced into the area of the trans foraminal spaces bilaterally with confirmation of proper needle position using AP, oblique, and lateral fluoroscopic views. Once the needle tip was in the area of the transforaminal space, and there was no blood, CSF or paraesthesias,  1.5 mL of Omnipaque 300mg/ml was injected  on each side for a total of 3mL.  Fluoroscopic imaging in the AP and lateral views revealed a clear outline of the spinal nerve with proximal spread of agent through the neural foramen into the epidural space. A total combination of 1.5mL of Bupivacaine and 5mg decadron was injected on each side for a total of 4mL of injected medications with displacement of the contrast dye confirming that the medication went into the area of the transforaminal spaces bilaterally. A sterile dressing was applied.   Patient tolerated the procedure well.    Conscious sedation provided by M.D    The patient was monitored with continuous pulse oximetry, EKG, and intermittent blood pressure monitors.  The patient was hemodynamically stable throughout the entire process was responsive to voice, and breathing spontaneously.  Supplemental O2 was provided at 2L/min via nasal cannula.  Patient was comfortable for the duration of the procedure. (See nurse documentation and case log for sedation time)    There was a total of 2mg IV Midazolam and 50mcg Fentanyl titrated for the procedure    Patient was taken back to the recovery room for further observation.     In observation the patient was having numbness and increased weakness in the left lower extremity, with 3/5 dorsiflexion on the left at the ankle and with 5/5 on the right.  No hemodynamic instability.  She was watched for an additional 2 hours and had some improvement in strength and was stable.  Discharged home, if there is persisting weakness tomorrow will obtain MRI.

## 2019-09-27 ENCOUNTER — TELEPHONE (OUTPATIENT)
Dept: PAIN MEDICINE | Facility: CLINIC | Age: 54
End: 2019-09-27

## 2019-09-27 NOTE — TELEPHONE ENCOUNTER
"Contacted and spoke with patient regarding how she was feeling.    Patient stated "there is no pain on the left side, but little pain on the right and if pain worsen   "

## 2019-09-27 NOTE — TELEPHONE ENCOUNTER
----- Message from Mikala David MD sent at 9/27/2019 10:28 AM CDT -----  Great, I'm glad that her leg is feeling better.    Can you please call the patient and see how she's doing today and if her strength is back?    Thanks    Mikala David

## 2019-10-07 DIAGNOSIS — G89.29 CHRONIC MIDLINE LOW BACK PAIN WITH BILATERAL SCIATICA: ICD-10-CM

## 2019-10-07 DIAGNOSIS — M54.41 CHRONIC MIDLINE LOW BACK PAIN WITH BILATERAL SCIATICA: ICD-10-CM

## 2019-10-07 DIAGNOSIS — M54.42 CHRONIC MIDLINE LOW BACK PAIN WITH BILATERAL SCIATICA: ICD-10-CM

## 2019-10-07 RX ORDER — HYDROCODONE BITARTRATE AND ACETAMINOPHEN 7.5; 325 MG/1; MG/1
1 TABLET ORAL 3 TIMES DAILY PRN
Qty: 90 TABLET | Refills: 0 | Status: SHIPPED | OUTPATIENT
Start: 2019-10-09 | End: 2019-11-07 | Stop reason: SDUPTHER

## 2019-10-08 ENCOUNTER — OFFICE VISIT (OUTPATIENT)
Dept: PHYSICAL MEDICINE AND REHAB | Facility: CLINIC | Age: 54
End: 2019-10-08
Payer: COMMERCIAL

## 2019-10-08 VITALS
SYSTOLIC BLOOD PRESSURE: 109 MMHG | HEART RATE: 84 BPM | HEIGHT: 69 IN | BODY MASS INDEX: 21.4 KG/M2 | WEIGHT: 144.5 LBS | DIASTOLIC BLOOD PRESSURE: 69 MMHG

## 2019-10-08 DIAGNOSIS — M54.41 CHRONIC MIDLINE LOW BACK PAIN WITH BILATERAL SCIATICA: Primary | ICD-10-CM

## 2019-10-08 DIAGNOSIS — Z79.891 CHRONIC USE OF OPIATE FOR THERAPEUTIC PURPOSE: ICD-10-CM

## 2019-10-08 DIAGNOSIS — G89.29 CHRONIC MIDLINE LOW BACK PAIN WITH BILATERAL SCIATICA: Primary | ICD-10-CM

## 2019-10-08 DIAGNOSIS — R20.0 BILATERAL HAND NUMBNESS: ICD-10-CM

## 2019-10-08 DIAGNOSIS — M51.36 DDD (DEGENERATIVE DISC DISEASE), LUMBAR: ICD-10-CM

## 2019-10-08 DIAGNOSIS — M54.42 CHRONIC MIDLINE LOW BACK PAIN WITH BILATERAL SCIATICA: Primary | ICD-10-CM

## 2019-10-08 PROCEDURE — 99999 PR PBB SHADOW E&M-EST. PATIENT-LVL III: ICD-10-PCS | Mod: PBBFAC,,, | Performed by: PHYSICAL MEDICINE & REHABILITATION

## 2019-10-08 PROCEDURE — 3008F BODY MASS INDEX DOCD: CPT | Mod: CPTII,S$GLB,, | Performed by: PHYSICAL MEDICINE & REHABILITATION

## 2019-10-08 PROCEDURE — 99214 OFFICE O/P EST MOD 30 MIN: CPT | Mod: S$GLB,,, | Performed by: PHYSICAL MEDICINE & REHABILITATION

## 2019-10-08 PROCEDURE — 3008F PR BODY MASS INDEX (BMI) DOCUMENTED: ICD-10-PCS | Mod: CPTII,S$GLB,, | Performed by: PHYSICAL MEDICINE & REHABILITATION

## 2019-10-08 PROCEDURE — 99214 PR OFFICE/OUTPT VISIT, EST, LEVL IV, 30-39 MIN: ICD-10-PCS | Mod: S$GLB,,, | Performed by: PHYSICAL MEDICINE & REHABILITATION

## 2019-10-08 PROCEDURE — 99999 PR PBB SHADOW E&M-EST. PATIENT-LVL III: CPT | Mod: PBBFAC,,, | Performed by: PHYSICAL MEDICINE & REHABILITATION

## 2019-10-08 NOTE — PROGRESS NOTES
Subjective:       Patient ID: Roxanne Templeton is a 54 y.o. female.    Chief Complaint: No chief complaint on file.    HPI    HISTORY OF PRESENT ILLNESS:  Ms. Templeton is a 54-year-old white female who is followed up in the Physical Medicine Clinic for chronic low back pain with lumbar radiculopathy.  Her last visit to the clinic was on 6/3/2019.  She was maintained on diclofenac, venlafaxine, p.r.n. hydrocodone/APAP and p.r.n. tizanidine.    Since her last visit, the patient called the clinic with exacerbation of her back pain and radicular symptoms.  MRI of the lumbar spine was done on 08/09/2019.  It was positive for multilevel degenerative changes with neural foraminal stenosis.  He was referred to [facility] pain [facility].  On 09/26/2019 she underwent bilateral L5-S1 transforaminal Epidural Steroid Injection by Dr. David.  She reports significant relief of her back pain (pain score went from 9 down to 2-3).    The patient is coning to the clinic for followup.  Her low back pain is still under good control since her Epidural Steroid Injection.  It is an intermittent aching pain in the lumbar spine and across her back.  She denies any significant radiation to her legs.  Her pain is worse with activity.  Her maximum pain 2-3/10 and minimum 0/10.  Today it is 0/10.  The patient denies any lower extremity weakness.  She denies any bowel or bladder incontinence.    She continues to complain of bilateral hand numbness.  He was referred last visit to the [facility] hand clinic.  He was not able to make it.  She prefers to wait at this moment.    She is currently taking diclofenac 75 mg p.o. twice per day, venlafaxine  mg capsule and one 75 mg capsule together once per day.  She takes hydrocodone/APAP 7.5/325 p.r.n. three times per day.  She takes tizanidine 4 mg p.r.n. 2-3 times per day.  He is trying to stay active.        Review of Systems   Constitutional: Negative for fatigue.   Eyes: Negative  for visual disturbance.   Respiratory: Negative for shortness of breath.    Cardiovascular: Negative for chest pain.   Gastrointestinal: Negative for blood in stool, constipation, nausea and vomiting.   Genitourinary: Negative for difficulty urinating.   Musculoskeletal: Positive for back pain. Negative for arthralgias, gait problem and neck pain.   Neurological: Negative for dizziness and headaches.   Psychiatric/Behavioral: Negative for behavioral problems and sleep disturbance.       Objective:      Physical Exam   Constitutional: She appears well-developed and well-nourished. No distress.   Neck: Normal range of motion.   Musculoskeletal:   BUE:  ROM:full.  Strength:    RUE: 5/5 at shoulder abduction, 5 elbow flexion, 5 elbow extension, 4 hand .   LUE: 5/5 at shoulder abduction, 5 elbow flexion, 5 elbow extension, 4 hand .  Sensation to pinprick:   RUE: decreased digit 1.   LUE: hypersensitive digit 1.    Tinnel's test:    RUE: -ve.   LUE: +ve.  Carpal Compression test:    RUE: -ve.   LUE: +ve.    BLE:  ROM:   RLE: full.   LLE: full.  Knee crepitus:   RLE: mild.   LLE: mild.   Strength:    RLE: 5/5 at hip flexion, 5 knee extension, 5 ankle DF, 5 PF.   LLE: 5/5 at hip flexion, 5 knee extension, 5 ankle DF, 5 PF.  Sensation to pinprick:     RLE: intact.      LLE: decreased.   SLR (sitting):      RLE: -ve.      LLE: -ve.    Mild tenderness over lumbar spine.      Gait: WNL       Neurological: She is alert.   Psychiatric: She has a normal mood and affect. Her behavior is normal.   Vitals reviewed.            Assessment:       1. Chronic midline low back pain with bilateral sciatica    2. DDD (degenerative disc disease), lumbar    3. Chronic use of opiate for therapeutic purpose    4. Bilateral hand numbness        Plan:     - Continue diclofenac (VOLTAREN) 75 MG EC tablet; Take 1 tablet (75 mg total) by mouth 2 (two) times daily.  - Continue venlafaxine (EFFEXOR-XR) 150 MG Cp24 and two 37.5 MG 24 hr  capsules; Take 1 capsule of each by mouth once daily.  - Continu HYDROcodone-acetaminophen (NORCO) 7.5-325 mg per tablet; Take 1 tablet by mouth 3 (three) times daily as needed for Pain.  - Continue  tiZANidine (ZANAFLEX) 4 MG tablet; Take 1 tablet (4 mg total) by mouth 3 (three) times daily as needed.  - Consult [facility] .  - Regular home exercise program was encouraged.  - Follow up in about 4 months (around 2/8/2020).     This was a 25 minute visit, more than 50% of which was spent counseling the patient about the diagnosis and the treatment plan.

## 2019-10-09 DIAGNOSIS — M54.41 CHRONIC MIDLINE LOW BACK PAIN WITH BILATERAL SCIATICA: ICD-10-CM

## 2019-10-09 DIAGNOSIS — G89.29 CHRONIC MIDLINE LOW BACK PAIN WITH BILATERAL SCIATICA: ICD-10-CM

## 2019-10-09 DIAGNOSIS — M54.50 CHRONIC MIDLINE LOW BACK PAIN WITHOUT SCIATICA: ICD-10-CM

## 2019-10-09 DIAGNOSIS — G89.29 CHRONIC MIDLINE LOW BACK PAIN WITHOUT SCIATICA: ICD-10-CM

## 2019-10-09 DIAGNOSIS — M54.42 CHRONIC MIDLINE LOW BACK PAIN WITH BILATERAL SCIATICA: ICD-10-CM

## 2019-10-09 RX ORDER — HYDROCODONE BITARTRATE AND ACETAMINOPHEN 7.5; 325 MG/1; MG/1
1 TABLET ORAL 3 TIMES DAILY PRN
Qty: 90 TABLET | Refills: 0 | Status: CANCELLED | OUTPATIENT
Start: 2019-10-09 | End: 2019-11-08

## 2019-10-10 RX ORDER — VENLAFAXINE HYDROCHLORIDE 150 MG/1
150 CAPSULE, EXTENDED RELEASE ORAL DAILY
Qty: 90 CAPSULE | Refills: 1 | Status: SHIPPED | OUTPATIENT
Start: 2019-10-10 | End: 2019-11-09

## 2019-10-17 ENCOUNTER — TELEPHONE (OUTPATIENT)
Dept: PAIN MEDICINE | Facility: CLINIC | Age: 54
End: 2019-10-17

## 2019-10-18 ENCOUNTER — OFFICE VISIT (OUTPATIENT)
Dept: PAIN MEDICINE | Facility: CLINIC | Age: 54
End: 2019-10-18
Payer: COMMERCIAL

## 2019-10-18 VITALS
DIASTOLIC BLOOD PRESSURE: 68 MMHG | HEIGHT: 69 IN | SYSTOLIC BLOOD PRESSURE: 144 MMHG | BODY MASS INDEX: 21.48 KG/M2 | OXYGEN SATURATION: 100 % | RESPIRATION RATE: 18 BRPM | WEIGHT: 145 LBS | TEMPERATURE: 97 F | HEART RATE: 84 BPM

## 2019-10-18 DIAGNOSIS — M54.16 LUMBAR RADICULOPATHY: ICD-10-CM

## 2019-10-18 DIAGNOSIS — G89.4 CHRONIC PAIN SYNDROME: Primary | ICD-10-CM

## 2019-10-18 PROCEDURE — 99213 PR OFFICE/OUTPT VISIT, EST, LEVL III, 20-29 MIN: ICD-10-PCS | Mod: S$GLB,,, | Performed by: NURSE PRACTITIONER

## 2019-10-18 PROCEDURE — 3008F BODY MASS INDEX DOCD: CPT | Mod: CPTII,S$GLB,, | Performed by: NURSE PRACTITIONER

## 2019-10-18 PROCEDURE — 3008F PR BODY MASS INDEX (BMI) DOCUMENTED: ICD-10-PCS | Mod: CPTII,S$GLB,, | Performed by: NURSE PRACTITIONER

## 2019-10-18 PROCEDURE — 99999 PR PBB SHADOW E&M-EST. PATIENT-LVL III: CPT | Mod: PBBFAC,,, | Performed by: NURSE PRACTITIONER

## 2019-10-18 PROCEDURE — 99213 OFFICE O/P EST LOW 20 MIN: CPT | Mod: S$GLB,,, | Performed by: NURSE PRACTITIONER

## 2019-10-18 PROCEDURE — 99999 PR PBB SHADOW E&M-EST. PATIENT-LVL III: ICD-10-PCS | Mod: PBBFAC,,, | Performed by: NURSE PRACTITIONER

## 2019-10-18 NOTE — PROGRESS NOTES
Chronic Pain - Established Visit    Referring Physician: No ref. provider found    Chief Complaint:   Chief Complaint   Patient presents with    Back Pain        SUBJECTIVE: Disclaimer: This note has been generated using voice-recognition software. There may be typographical errors that have been missed during proof-reading    Interval History 10/18/2019:  The patient is here for follow up of back and leg pain.  She is s/p bilateral L4 TF KEMI with 95% relief.  She still has some aching pain to the right side which is tolerable.  She continues to follow up with Dr. Hung.  Her pain today is 3/10.    Initial encounter:    Roxanne Templeton presents to the clinic for the evaluation of lower back pain. The pain started years ago insidiously and symptoms have been unchanged.    Brief history:  In treatment with Dr. Hung and has been on medication management    Pain Description:    The pain is located in the lower back area and radiates to the left lower extremity in the L4/5 distribution.      At BEST  4/10     At WORST  9/10 on the WORST day.      On average pain is rated as 6/10.     Today the pain is rated as 4/10    The pain is described as aching, numbing, sharp, shooting and throbbing      Symptoms interfere with daily activity and sleeping.     Exacerbating factors: Sitting, Standing, Laying, Bending, Walking, Night Time, Morning, Extension, Flexing and Getting out of bed/chair.      Mitigating factors medications and rest.     Patient denies urinary incontinence and bowel incontinence.  Patient denies any suicidal or homicidal ideations    Pain Medications:  Current:  Diclofenac  Venlafaxine  Hydrocodone 5/325 TID PRN  tizanidine    Tried in Past:  NSAIDs -Never  TCA -Never  SNRI -Never  Anti-convulsants -Never  Muscle Relaxants -Never  Opioids-Never    Physical Therapy/Home Exercise: yes  In the past without significant improvement, and patient has continued maintenance daily      report:  Reviewed  and consistent with medication use as prescribed.    Pain Procedures: in the past - epidural injections in the past.  No relief 2011 and 2012 with interventional radiology  9/26/19 Bilateral L4-5 TF KEMI- 95% relief    Chiropractor -never  Acupuncture - never  TENS unit -never  Spinal decompression -never  Joint replacement -never    Imaging:   MRI lumbar spine 8/9/2019  EXAMINATION:  MRI LUMBAR SPINE WITHOUT CONTRAST    CLINICAL HISTORY:  Chronic low back pain.    TECHNIQUE:  Multiplanar, multisequence MR images were acquired from the thoracolumbar junction to the sacrum without contrast.    COMPARISON:  MRI lumbar spine from 11/12/2012, 10/22/2007.    FINDINGS:  Alignment: Normal.    Vertebrae: Normal marrow signal. No fracture.    Discs: Disc height loss and disc desiccation at the L3-L4 and L4-L5 levels.    Cord: Normal.  Conus terminates at T12.    Degenerative findings:    T12-L1,L1-L2, and L2-L3: No spinal canal stenosis or neural foraminal narrowing.    L3-L4: Diffuse disc bulge with superimposed central disc protrusion, mildly progressed in comparison with prior imaging, results in mild spinal canal stenosis, and mild bilateral neural foraminal narrowing.    L4-L5: Diffuse disc bulge with superimposed central disc extrusion, migrating inferiorly results in mild left and moderate right neural foraminal narrowing.  No significant spinal canal stenosis.    L5-S1: No spinal canal stenosis or neural foraminal narrowing.    Paraspinal muscles & soft tissues: Unremarkable.      Impression       Multilevel degenerative changes of the lumbar spine, worst at the levels of L3-L4 and L4-L5.  At L3-L4 there is mild spinal canal stenosis and mild bilateral neural foraminal narrowing, and at L4-L5 there is mild left and moderate right neural foraminal narrowing.    Electronically signed by resident: Saad Orosco  Date: 08/09/2019  Time: 10:11     Xray flex/ext lumbar spine 3/27/2018  EXAMINATION:  XR LUMBAR SPINE AP AND  LAT WITH FLEX/EXT    CLINICAL HISTORY:  Low back pain    FINDINGS:  No fracture dislocation bone destruction seen.  There is mild DJD.  No instability is seen.      Impression              Past Medical History:   Diagnosis Date    Allergy     Chronic back pain     Chronic back pain     Hyperlipidemia      Past Surgical History:   Procedure Laterality Date    FOOT SURGERY Left     toe    HYSTERECTOMY  1995    RICHARD (AUB), ovaries remain    TRANSFORAMINAL EPIDURAL INJECTION OF STEROID Bilateral 9/26/2019    Procedure: INJECTION, STEROID, EPIDURAL, TRANSFORAMINAL APPROACH;  Surgeon: Mikala David MD;  Location[facility];  Service: Pain Management;  Laterality: Bilateral;  B/L TFESI L4/L5    TUBAL LIGATION       Social History     Socioeconomic History    Marital status:      Spouse name: Alec    Number of children: 2    Years of education: Not on file    Highest education level: Not on file   Occupational History    Occupation: Personal Care Manager     Employer: [employer]      Employer: [employer]    Social Needs    Financial resource strain: Not on file    Food insecurity:     Worry: Not on file     Inability: Not on file    Transportation needs:     Medical: Not on file     Non-medical: Not on file   Tobacco Use    Smoking status: Former Smoker     Packs/day: 0.50     Years: 30.00     Pack years: 15.00     Start date: 1/1/1981    Smokeless tobacco: Never Used   Substance and Sexual Activity    Alcohol use: No     Alcohol/week: 0.0 standard drinks    Drug use: No    Sexual activity: Yes     Partners: Male     Comment: , together x 23 years, since 1995   Lifestyle    Physical activity:     Days per week: Not on file     Minutes per session: Not on file    Stress: Not on file   Relationships    Social connections:     Talks on phone: Not on file     Gets together: Not on file     Attends Hinduism service: Not on file     Active  member of club or organization: Not on file     Attends meetings of clubs or organizations: Not on file     Relationship status: Not on file   Other Topics Concern    Not on file   Social History Narrative    Not on file     Family History   Problem Relation Age of Onset    Colon polyps Mother     Diabetes Neg Hx     Heart disease Neg Hx     Breast cancer Neg Hx     Hypertension Neg Hx     Ovarian cancer Neg Hx        Review of patient's allergies indicates:   Allergen Reactions    Dilaudid  [hydromorphone (bulk)]      Other reaction(s): Itching    Latex, natural rubber     Zithromax  [azithromycin]      Other reaction(s): Hives       Current Outpatient Medications   Medication Sig    atorvastatin (LIPITOR) 40 MG tablet Take 1 tablet (40 mg total) by mouth once daily.    diphth,pertus,acell,,tetanus (BOOSTRIX) 2.5-8-5 Lf-mcg-Lf/0.5mL Syrg injection Inject into the muscle.    HYDROcodone-acetaminophen (NORCO) 7.5-325 mg per tablet Take 1 tablet by mouth 3 (three) times daily as needed for Pain.    tiZANidine (ZANAFLEX) 4 MG tablet Take 1 tablet (4 mg total) by mouth 3 (three) times daily as needed.    venlafaxine (EFFEXOR-XR) 150 MG Cp24 Take 1 capsule (150 mg total) by mouth once daily.    venlafaxine (EFFEXOR-XR) 150 MG Cp24 TAKE 1 CAPSULE (150 MG TOTAL) BY MOUTH ONCE DAILY.    venlafaxine (EFFEXOR-XR) 75 MG 24 hr capsule Take 1 capsule (75 mg total) by mouth once daily. With 150mg for total 225mg    calcium-vitamin D (CALCIUM-VITAMIN D) 500 mg(1,250mg) -200 unit per tablet Take 2 tablets by mouth 2 (two) times daily with meals.      diazePAM (VALIUM) 5 MG tablet Take 1 tablet (5 mg total) by mouth As instructed for Anxiety. Take 1 tablet half an hour before MRI. May repeat if necessary. (Patient not taking: Reported on 10/18/2019)    diclofenac (VOLTAREN) 75 MG EC tablet Take 1 tablet (75 mg total) by mouth 2 (two) times daily.     No current facility-administered medications for this visit.   "      REVIEW OF SYSTEMS:    GENERAL:  No weight loss, malaise or fevers.  HEENT:   No recent changes in vision or hearing  NECK:  Negative for lumps, no difficulty with swallowing.  RESPIRATORY:  Negative for cough, wheezing or shortness of breath, patient denies any recent URI.  CARDIOVASCULAR:  Negative for chest pain, leg swelling or palpitations.  GI:  Negative for abdominal discomfort, blood in stools or black stools or change in bowel habits.  MUSCULOSKELETAL:  See HPI.  SKIN:  Negative for lesions, rash, and itching.  PSYCH:  No mood disorder or recent psychosocial stressors.  Patients sleep is not disturbed secondary to pain.  HEMATOLOGY/LYMPHOLOGY:  Negative for prolonged bleeding, bruising easily or swollen nodes.  Patient is not currently taking any anti-coagulants  ENDO: No history of diabetes or thyroid dysfunction  NEURO:   No history of headaches, syncope, paralysis, seizures or tremors.  All other reviewed and negative other than HPI.    OBJECTIVE:    BP (!) 144/68   Pulse 84   Temp 97 °F (36.1 °C)   Resp 18   Ht 5' 9" (1.753 m)   Wt 65.8 kg (145 lb)   SpO2 100%   BMI 21.41 kg/m²     PHYSICAL EXAMINATION:    GENERAL: Well appearing, in no acute distress, alert and oriented x3.  PSYCH:  Mood and affect appropriate.  SKIN: Skin color, texture, turgor normal, no rashes or lesions.  HEAD/FACE:  Normocephalic, atraumatic. Cranial nerves grossly intact.  CV: RRR with palpation of the radial artery.  PULM: No evidence of respiratory difficulty, symmetric chest rise.  BACK: Straight leg raising in the sitting and supine positions is negative to radicular pain.  There is pain with palpation over the facet joints of the lumbar spine bilaterally. There is decreased range of motion with extension to 15 degrees and flexion secondary to pain, and facet loading maneuvers cause reproducible pain.    EXTREMITIES: Peripheral joint ROM is full and pain free without obvious instability or laxity in all four " extremities. No deformities, edema, or skin discoloration. Good capillary refill.  MUSCULOSKELETAL: Hip, and knee provocative maneuvers are negative.  There is  pain with palpation over the sacroiliac joints bilaterally.  There is no pain to palpation over the greater trochanteric bursa bilaterally.  FABERs test is negative.  FADIRs test is negative.   EHL on Right 5/5, EHL on Left EHL 5/5.  5/5 strength in right ankle with plantar and 4/5 dorsiflexion, 5/5 strength in left ankle with plantar and 4/5 dorsiflexion, 5/5 strength with right knee flexion extension, 5/5 strength with knee flexion extension on the left. 5/5 Hip flexion on right, 5/5 hip flexion on the left. .  No atrophy or tone abnormalities are noted.  NEURO: Bilateral lower extremity coordination and muscle stretch reflexes are physiologic and symmetric.  Plantar response are downgoing. No clonus.  No loss of sensation ismal. noted.  GAIT: nor        Lab Results   Component Value Date    WBC 5.73 06/15/2019    HGB 12.2 06/15/2019    HCT 38.9 06/15/2019    MCV 85 06/15/2019     06/15/2019     BMP  Lab Results   Component Value Date     06/15/2019    K 4.4 06/15/2019     06/15/2019    CO2 21 (L) 06/15/2019    BUN 14 06/15/2019    CREATININE 0.8 06/15/2019    CALCIUM 9.2 06/15/2019    ANIONGAP 14 06/15/2019    ESTGFRAFRICA >60 06/15/2019    EGFRNONAA >60 06/15/2019     Lab Results   Component Value Date    HGBA1C 5.4 01/23/2014         ASSESSMENT: 54 y.o. year old female with pain, consistent with     Encounter Diagnoses   Name Primary?    Chronic pain syndrome Yes    Lumbar radiculopathy        PLAN:     - Previous imaging was reviewed and discussed with the patient today.    - She is s/p bilateral L4/5 transforaminal epidural injections with benefit.    - F/U PRN.    - Continue medications as per Dr. Hung        The above plan and management options were discussed at length with patient. Patient is in agreement with the above and  verbalized understanding.   Aundrea Chris  10/18/2019

## 2019-10-20 ENCOUNTER — PATIENT MESSAGE (OUTPATIENT)
Dept: PAIN MEDICINE | Facility: CLINIC | Age: 54
End: 2019-10-20

## 2019-10-21 ENCOUNTER — PATIENT MESSAGE (OUTPATIENT)
Dept: PAIN MEDICINE | Facility: CLINIC | Age: 54
End: 2019-10-21

## 2019-11-03 DIAGNOSIS — M54.41 CHRONIC MIDLINE LOW BACK PAIN WITH BILATERAL SCIATICA: ICD-10-CM

## 2019-11-03 DIAGNOSIS — G89.29 CHRONIC MIDLINE LOW BACK PAIN WITH BILATERAL SCIATICA: ICD-10-CM

## 2019-11-03 DIAGNOSIS — M54.42 CHRONIC MIDLINE LOW BACK PAIN WITH BILATERAL SCIATICA: ICD-10-CM

## 2019-11-03 RX ORDER — TIZANIDINE 4 MG/1
4 TABLET ORAL 3 TIMES DAILY PRN
Qty: 270 TABLET | Refills: 1 | Status: SHIPPED | OUTPATIENT
Start: 2019-11-03 | End: 2020-01-07 | Stop reason: SDUPTHER

## 2019-11-04 DIAGNOSIS — E78.2 MIXED HYPERLIPIDEMIA: ICD-10-CM

## 2019-11-04 RX ORDER — ATORVASTATIN CALCIUM 40 MG/1
TABLET, FILM COATED ORAL
Qty: 90 TABLET | Refills: 3 | Status: SHIPPED | OUTPATIENT
Start: 2019-11-04 | End: 2020-09-04

## 2019-11-07 DIAGNOSIS — G89.29 CHRONIC MIDLINE LOW BACK PAIN WITH BILATERAL SCIATICA: ICD-10-CM

## 2019-11-07 DIAGNOSIS — M54.41 CHRONIC MIDLINE LOW BACK PAIN WITH BILATERAL SCIATICA: ICD-10-CM

## 2019-11-07 DIAGNOSIS — M54.42 CHRONIC MIDLINE LOW BACK PAIN WITH BILATERAL SCIATICA: ICD-10-CM

## 2019-11-07 RX ORDER — HYDROCODONE BITARTRATE AND ACETAMINOPHEN 7.5; 325 MG/1; MG/1
1 TABLET ORAL 3 TIMES DAILY PRN
Qty: 90 TABLET | Refills: 0 | Status: SHIPPED | OUTPATIENT
Start: 2019-11-08 | End: 2019-12-06 | Stop reason: SDUPTHER

## 2019-12-01 DIAGNOSIS — M54.42 CHRONIC MIDLINE LOW BACK PAIN WITH BILATERAL SCIATICA: ICD-10-CM

## 2019-12-01 DIAGNOSIS — M54.41 CHRONIC MIDLINE LOW BACK PAIN WITH BILATERAL SCIATICA: ICD-10-CM

## 2019-12-01 DIAGNOSIS — G89.29 CHRONIC MIDLINE LOW BACK PAIN WITH BILATERAL SCIATICA: ICD-10-CM

## 2019-12-02 RX ORDER — DICLOFENAC SODIUM 75 MG/1
TABLET, DELAYED RELEASE ORAL
Qty: 180 TABLET | Refills: 1 | Status: SHIPPED | OUTPATIENT
Start: 2019-12-02 | End: 2020-06-15 | Stop reason: SDUPTHER

## 2019-12-06 DIAGNOSIS — G89.29 CHRONIC MIDLINE LOW BACK PAIN WITH BILATERAL SCIATICA: ICD-10-CM

## 2019-12-06 DIAGNOSIS — M54.41 CHRONIC MIDLINE LOW BACK PAIN WITH BILATERAL SCIATICA: ICD-10-CM

## 2019-12-06 DIAGNOSIS — M54.42 CHRONIC MIDLINE LOW BACK PAIN WITH BILATERAL SCIATICA: ICD-10-CM

## 2019-12-06 RX ORDER — HYDROCODONE BITARTRATE AND ACETAMINOPHEN 7.5; 325 MG/1; MG/1
1 TABLET ORAL 3 TIMES DAILY PRN
Qty: 90 TABLET | Refills: 0 | Status: SHIPPED | OUTPATIENT
Start: 2019-12-07 | End: 2020-01-07 | Stop reason: SDUPTHER

## 2020-01-07 DIAGNOSIS — M54.42 CHRONIC MIDLINE LOW BACK PAIN WITH BILATERAL SCIATICA: ICD-10-CM

## 2020-01-07 DIAGNOSIS — M54.41 CHRONIC MIDLINE LOW BACK PAIN WITH BILATERAL SCIATICA: ICD-10-CM

## 2020-01-07 DIAGNOSIS — G89.29 CHRONIC MIDLINE LOW BACK PAIN WITH BILATERAL SCIATICA: ICD-10-CM

## 2020-01-07 RX ORDER — HYDROCODONE BITARTRATE AND ACETAMINOPHEN 7.5; 325 MG/1; MG/1
1 TABLET ORAL 3 TIMES DAILY PRN
Qty: 90 TABLET | Refills: 0 | Status: SHIPPED | OUTPATIENT
Start: 2020-01-07 | End: 2020-02-07 | Stop reason: SDUPTHER

## 2020-01-07 RX ORDER — TIZANIDINE 4 MG/1
4 TABLET ORAL 3 TIMES DAILY PRN
Qty: 270 TABLET | Refills: 1 | Status: SHIPPED | OUTPATIENT
Start: 2020-01-07 | End: 2020-03-17 | Stop reason: SDUPTHER

## 2020-01-07 NOTE — TELEPHONE ENCOUNTER
Last Rx refill-----12/06/19-Hydrocodone                             11/03/19-Zanaflex  Last office visit--10/08/19  Next office visit--02/18/20

## 2020-02-07 ENCOUNTER — PATIENT MESSAGE (OUTPATIENT)
Dept: PHYSICAL MEDICINE AND REHAB | Facility: CLINIC | Age: 55
End: 2020-02-07

## 2020-02-07 DIAGNOSIS — G89.29 CHRONIC MIDLINE LOW BACK PAIN WITH BILATERAL SCIATICA: ICD-10-CM

## 2020-02-07 DIAGNOSIS — M54.42 CHRONIC MIDLINE LOW BACK PAIN WITH BILATERAL SCIATICA: ICD-10-CM

## 2020-02-07 DIAGNOSIS — M54.41 CHRONIC MIDLINE LOW BACK PAIN WITH BILATERAL SCIATICA: ICD-10-CM

## 2020-02-08 RX ORDER — HYDROCODONE BITARTRATE AND ACETAMINOPHEN 7.5; 325 MG/1; MG/1
1 TABLET ORAL 3 TIMES DAILY PRN
Qty: 90 TABLET | Refills: 0 | Status: SHIPPED | OUTPATIENT
Start: 2020-02-08 | End: 2020-03-06 | Stop reason: SDUPTHER

## 2020-02-10 ENCOUNTER — PATIENT MESSAGE (OUTPATIENT)
Dept: PHYSICAL MEDICINE AND REHAB | Facility: CLINIC | Age: 55
End: 2020-02-10

## 2020-02-18 ENCOUNTER — OFFICE VISIT (OUTPATIENT)
Dept: PHYSICAL MEDICINE AND REHAB | Facility: CLINIC | Age: 55
End: 2020-02-18
Payer: COMMERCIAL

## 2020-02-18 VITALS
BODY MASS INDEX: 20.57 KG/M2 | HEART RATE: 83 BPM | DIASTOLIC BLOOD PRESSURE: 68 MMHG | SYSTOLIC BLOOD PRESSURE: 109 MMHG | HEIGHT: 69 IN | WEIGHT: 138.88 LBS

## 2020-02-18 DIAGNOSIS — G89.29 CHRONIC MIDLINE LOW BACK PAIN WITH BILATERAL SCIATICA: Primary | ICD-10-CM

## 2020-02-18 DIAGNOSIS — M54.41 CHRONIC MIDLINE LOW BACK PAIN WITH BILATERAL SCIATICA: Primary | ICD-10-CM

## 2020-02-18 DIAGNOSIS — M51.36 DDD (DEGENERATIVE DISC DISEASE), LUMBAR: ICD-10-CM

## 2020-02-18 DIAGNOSIS — R20.0 BILATERAL HAND NUMBNESS: ICD-10-CM

## 2020-02-18 DIAGNOSIS — Z79.891 CHRONIC USE OF OPIATE FOR THERAPEUTIC PURPOSE: ICD-10-CM

## 2020-02-18 DIAGNOSIS — M54.42 CHRONIC MIDLINE LOW BACK PAIN WITH BILATERAL SCIATICA: Primary | ICD-10-CM

## 2020-02-18 PROCEDURE — 3008F PR BODY MASS INDEX (BMI) DOCUMENTED: ICD-10-PCS | Mod: CPTII,S$GLB,, | Performed by: PHYSICAL MEDICINE & REHABILITATION

## 2020-02-18 PROCEDURE — 99999 PR PBB SHADOW E&M-EST. PATIENT-LVL III: CPT | Mod: PBBFAC,,, | Performed by: PHYSICAL MEDICINE & REHABILITATION

## 2020-02-18 PROCEDURE — 99214 OFFICE O/P EST MOD 30 MIN: CPT | Mod: S$GLB,,, | Performed by: PHYSICAL MEDICINE & REHABILITATION

## 2020-02-18 PROCEDURE — 99214 PR OFFICE/OUTPT VISIT, EST, LEVL IV, 30-39 MIN: ICD-10-PCS | Mod: S$GLB,,, | Performed by: PHYSICAL MEDICINE & REHABILITATION

## 2020-02-18 PROCEDURE — 99999 PR PBB SHADOW E&M-EST. PATIENT-LVL III: ICD-10-PCS | Mod: PBBFAC,,, | Performed by: PHYSICAL MEDICINE & REHABILITATION

## 2020-02-18 PROCEDURE — 3008F BODY MASS INDEX DOCD: CPT | Mod: CPTII,S$GLB,, | Performed by: PHYSICAL MEDICINE & REHABILITATION

## 2020-02-18 NOTE — PROGRESS NOTES
Subjective:       Patient ID: Roxanne Templeton is a 55 y.o. female.    Chief Complaint: No chief complaint on file.    HPI    HISTORY OF PRESENT ILLNESS:  Ms. Templeton is a 55-year-old white female who is followed up in the Physical Medicine Clinic for chronic low back pain with lumbar radiculopathy.  She is status post bilateral L5-S1 transforaminal Epidural Steroid Injection in 09/2019 significant relief.  Her last visit to the clinic was on 10/8/19.  She was maintained on diclofenac, venlafaxine, p.r.n. hydrocodone/APAP and p.r.n. tizanidine.    The patient is coning to the clinic for followup.  Her low back pain has been recently worse.  It is an intermittent aching pain in the lumbar spine and across her back.  She has occasional shooting pain to both ankles with throbbing and hurting sensations, worse on the left.  Her pain is worse with activity and prolonged standing or walking.  Her maximum pain 7-8/10 and minimum 1-2/10.  Today it is 3-4/10.  The patient has mild bilateral lower extremity weakness.  She denies any bowel or bladder incontinence.    Her  bilateral hand numbness has been better.  She did not see the need to go to the hand clinic.    She is currently taking diclofenac 75 mg p.o. twice per day, venlafaxine  mg capsule and one 75 mg capsule together once per day.  She takes hydrocodone/APAP 7.5/325 p.r.n. three times per day.  She takes tizanidine 4 mg p.r.n. 2-3 times per day.        Past Medical History:   Diagnosis Date    Allergy     Chronic back pain     Chronic back pain     Hyperlipidemia          Review of Systems   Constitutional: Negative for fatigue.   Eyes: Negative for visual disturbance.   Respiratory: Negative for shortness of breath.    Cardiovascular: Negative for chest pain.   Gastrointestinal: Negative for blood in stool, constipation, nausea and vomiting.   Genitourinary: Negative for difficulty urinating.   Musculoskeletal: Positive for back pain. Negative for  arthralgias, gait problem and neck pain.   Neurological: Negative for dizziness and headaches.   Psychiatric/Behavioral: Negative for behavioral problems and sleep disturbance.       Objective:      Physical Exam   Constitutional: She appears well-developed and well-nourished. No distress.   Neck: Normal range of motion.   Musculoskeletal:   BUE:  ROM:full.  Strength:    RUE: 5/5 at shoulder abduction, 5 elbow flexion, 5 elbow extension, 5 hand .   LUE: 5/5 at shoulder abduction, 5 elbow flexion, 5 elbow extension, 5 hand .  Sensation to pinprick:   RUE: intact.   LUE: intact1.    BLE:  ROM:   RLE: full.   LLE: full.  Knee crepitus:   RLE: mild.   LLE: mild.   Strength:    RLE: 5/5 at hip flexion, 5 knee extension, 5 ankle DF, 5 PF.   LLE: 5/5 at hip flexion, 5 knee extension, 5 ankle DF, 5 PF.  Sensation to pinprick:     RLE: intact.      LLE: intact.   SLR (sitting):      RLE: -ve.      LLE: -ve.    Mild tenderness over lumbar spine.      Gait: WNL       Neurological: She is alert.   Psychiatric: She has a normal mood and affect. Her behavior is normal.   Vitals reviewed.            Assessment:       1. Chronic midline low back pain with bilateral sciatica    2. DDD (degenerative disc disease), lumbar    3. Bilateral hand numbness    4. Chronic use of opiate for therapeutic purpose        Plan:     - Continue diclofenac (VOLTAREN) 75 MG EC tablet; Take 1 tablet (75 mg total) by mouth 2 (two) times daily.  - Continue venlafaxine (EFFEXOR-XR) 150 MG Cp24+ 75 MG 24 hr capsules; Take 1 capsule of each by mouth once daily.  She was asked to take a total of 300 mg per day for few days and stay with higher dose if it helps without significant problems.  - Continu HYDROcodone-acetaminophen (NORCO) 7.5-325 mg per tablet; Take 1 tablet by mouth 3 (three) times daily as needed for Pain.  - Continue  tiZANidine (ZANAFLEX) 4 MG tablet; Take 1 tablet (4 mg total) by mouth 3 (three) times daily as needed.  - Regular home  exercise program was encouraged.  - Follow-up with the Pain Clinic as needed for more Epidural Steroid Injections.  - Follow up in about 4 months (around 6/18/2020).     This was a 25 minute visit, more than 50% of which was spent counseling the patient about the diagnosis and the treatment plan.    This note was partly generated with [employer] voice recognition software. I apologize for any possible typographical errors.

## 2020-02-21 ENCOUNTER — PATIENT MESSAGE (OUTPATIENT)
Dept: PHYSICAL MEDICINE AND REHAB | Facility: CLINIC | Age: 55
End: 2020-02-21

## 2020-03-06 DIAGNOSIS — M54.42 CHRONIC MIDLINE LOW BACK PAIN WITH BILATERAL SCIATICA: ICD-10-CM

## 2020-03-06 DIAGNOSIS — M54.41 CHRONIC MIDLINE LOW BACK PAIN WITH BILATERAL SCIATICA: ICD-10-CM

## 2020-03-06 DIAGNOSIS — G89.29 CHRONIC MIDLINE LOW BACK PAIN WITH BILATERAL SCIATICA: ICD-10-CM

## 2020-03-06 RX ORDER — HYDROCODONE BITARTRATE AND ACETAMINOPHEN 7.5; 325 MG/1; MG/1
1 TABLET ORAL 3 TIMES DAILY PRN
Qty: 90 TABLET | Refills: 0 | Status: SHIPPED | OUTPATIENT
Start: 2020-03-09 | End: 2020-04-08 | Stop reason: SDUPTHER

## 2020-03-17 DIAGNOSIS — M54.42 CHRONIC MIDLINE LOW BACK PAIN WITH BILATERAL SCIATICA: ICD-10-CM

## 2020-03-17 DIAGNOSIS — M54.41 CHRONIC MIDLINE LOW BACK PAIN WITH BILATERAL SCIATICA: ICD-10-CM

## 2020-03-17 DIAGNOSIS — G89.29 CHRONIC MIDLINE LOW BACK PAIN WITH BILATERAL SCIATICA: ICD-10-CM

## 2020-03-17 RX ORDER — TIZANIDINE 4 MG/1
4 TABLET ORAL 3 TIMES DAILY PRN
Qty: 270 TABLET | Refills: 1 | Status: SHIPPED | OUTPATIENT
Start: 2020-03-17 | End: 2020-10-26 | Stop reason: SDUPTHER

## 2020-03-24 ENCOUNTER — PATIENT MESSAGE (OUTPATIENT)
Dept: PHYSICAL MEDICINE AND REHAB | Facility: CLINIC | Age: 55
End: 2020-03-24

## 2020-03-24 DIAGNOSIS — F33.1 MODERATE EPISODE OF RECURRENT MAJOR DEPRESSIVE DISORDER: ICD-10-CM

## 2020-03-24 RX ORDER — VENLAFAXINE HYDROCHLORIDE 150 MG/1
150 CAPSULE, EXTENDED RELEASE ORAL DAILY
Qty: 90 CAPSULE | Refills: 2 | Status: SHIPPED | OUTPATIENT
Start: 2020-03-24 | End: 2020-03-25 | Stop reason: SDUPTHER

## 2020-03-25 ENCOUNTER — PATIENT MESSAGE (OUTPATIENT)
Dept: PHYSICAL MEDICINE AND REHAB | Facility: CLINIC | Age: 55
End: 2020-03-25

## 2020-03-25 RX ORDER — VENLAFAXINE HYDROCHLORIDE 150 MG/1
300 CAPSULE, EXTENDED RELEASE ORAL DAILY
Qty: 180 CAPSULE | Refills: 1 | Status: SHIPPED | OUTPATIENT
Start: 2020-03-25 | End: 2020-09-04

## 2020-03-25 NOTE — TELEPHONE ENCOUNTER
I called the patient.  [facility] wanted to wait for refilling her prescription for venlafaxine.  I sent another prescription for venlafaxine  mg capsules, 2 capsules once per day to [facility].  I left instructions to call me if there is any problem.

## 2020-03-30 ENCOUNTER — PATIENT MESSAGE (OUTPATIENT)
Dept: INTERNAL MEDICINE | Facility: CLINIC | Age: 55
End: 2020-03-30

## 2020-03-31 ENCOUNTER — PATIENT MESSAGE (OUTPATIENT)
Dept: INTERNAL MEDICINE | Facility: CLINIC | Age: 55
End: 2020-03-31

## 2020-04-08 DIAGNOSIS — M54.42 CHRONIC MIDLINE LOW BACK PAIN WITH BILATERAL SCIATICA: ICD-10-CM

## 2020-04-08 DIAGNOSIS — M54.41 CHRONIC MIDLINE LOW BACK PAIN WITH BILATERAL SCIATICA: ICD-10-CM

## 2020-04-08 DIAGNOSIS — G89.29 CHRONIC MIDLINE LOW BACK PAIN WITH BILATERAL SCIATICA: ICD-10-CM

## 2020-04-08 RX ORDER — HYDROCODONE BITARTRATE AND ACETAMINOPHEN 7.5; 325 MG/1; MG/1
1 TABLET ORAL 3 TIMES DAILY PRN
Qty: 90 TABLET | Refills: 0 | Status: SHIPPED | OUTPATIENT
Start: 2020-04-08 | End: 2020-05-07 | Stop reason: SDUPTHER

## 2020-05-01 ENCOUNTER — PATIENT MESSAGE (OUTPATIENT)
Dept: INTERNAL MEDICINE | Facility: CLINIC | Age: 55
End: 2020-05-01

## 2020-05-01 DIAGNOSIS — R05.9 COUGH: Primary | ICD-10-CM

## 2020-05-01 RX ORDER — BENZONATATE 200 MG/1
200 CAPSULE ORAL 3 TIMES DAILY PRN
Qty: 30 CAPSULE | Refills: 0 | Status: SHIPPED | OUTPATIENT
Start: 2020-05-01 | End: 2020-05-11

## 2020-05-27 ENCOUNTER — PATIENT MESSAGE (OUTPATIENT)
Dept: PHYSICAL MEDICINE AND REHAB | Facility: CLINIC | Age: 55
End: 2020-05-27

## 2020-05-27 DIAGNOSIS — M54.41 CHRONIC MIDLINE LOW BACK PAIN WITH BILATERAL SCIATICA: ICD-10-CM

## 2020-05-27 DIAGNOSIS — M54.42 CHRONIC MIDLINE LOW BACK PAIN WITH BILATERAL SCIATICA: ICD-10-CM

## 2020-05-27 DIAGNOSIS — G89.29 CHRONIC MIDLINE LOW BACK PAIN WITH BILATERAL SCIATICA: ICD-10-CM

## 2020-05-27 RX ORDER — HYDROCODONE BITARTRATE AND ACETAMINOPHEN 7.5; 325 MG/1; MG/1
1 TABLET ORAL EVERY 6 HOURS PRN
Qty: 90 TABLET | Refills: 0 | Status: SHIPPED | OUTPATIENT
Start: 2020-06-02 | End: 2020-06-29 | Stop reason: SDUPTHER

## 2020-05-27 NOTE — TELEPHONE ENCOUNTER
I called the patient.  He had tooth pain and a dental procedure.  She had to take more of her hydrocodone/APAP.  I adjusted the dose and sent an early refill for 06/02/2020.

## 2020-06-18 ENCOUNTER — HOSPITAL ENCOUNTER (OUTPATIENT)
Dept: RADIOLOGY | Facility: HOSPITAL | Age: 55
Discharge: HOME | End: 2020-06-18
Attending: INTERNAL MEDICINE
Payer: COMMERCIAL

## 2020-06-18 DIAGNOSIS — Z12.31 VISIT FOR SCREENING MAMMOGRAM: ICD-10-CM

## 2020-06-18 PROCEDURE — 77063 BREAST TOMOSYNTHESIS BI: CPT | Mod: 26,,, | Performed by: RADIOLOGY

## 2020-06-18 PROCEDURE — 77067 MAMMO DIGITAL SCREENING BILAT WITH TOMOSYNTHESIS_CAD: ICD-10-PCS | Mod: 26,,, | Performed by: RADIOLOGY

## 2020-06-18 PROCEDURE — 77063 MAMMO DIGITAL SCREENING BILAT WITH TOMOSYNTHESIS_CAD: ICD-10-PCS | Mod: 26,,, | Performed by: RADIOLOGY

## 2020-06-18 PROCEDURE — 77067 SCR MAMMO BI INCL CAD: CPT | Mod: TC

## 2020-06-18 PROCEDURE — 77067 SCR MAMMO BI INCL CAD: CPT | Mod: 26,,, | Performed by: RADIOLOGY

## 2020-06-29 ENCOUNTER — LAB VISIT (OUTPATIENT)
Dept: LAB | Facility: HOSPITAL | Age: 55
End: 2020-06-29
Attending: PHYSICAL MEDICINE & REHABILITATION
Payer: COMMERCIAL

## 2020-06-29 ENCOUNTER — OFFICE VISIT (OUTPATIENT)
Dept: PHYSICAL MEDICINE AND REHAB | Facility: CLINIC | Age: 55
End: 2020-06-29
Payer: COMMERCIAL

## 2020-06-29 VITALS
HEIGHT: 69 IN | BODY MASS INDEX: 21.52 KG/M2 | HEART RATE: 96 BPM | SYSTOLIC BLOOD PRESSURE: 114 MMHG | WEIGHT: 145.31 LBS | DIASTOLIC BLOOD PRESSURE: 72 MMHG

## 2020-06-29 DIAGNOSIS — M51.36 DDD (DEGENERATIVE DISC DISEASE), LUMBAR: ICD-10-CM

## 2020-06-29 DIAGNOSIS — M54.42 CHRONIC MIDLINE LOW BACK PAIN WITH BILATERAL SCIATICA: Primary | ICD-10-CM

## 2020-06-29 DIAGNOSIS — G89.29 CHRONIC MIDLINE LOW BACK PAIN WITH BILATERAL SCIATICA: Primary | ICD-10-CM

## 2020-06-29 DIAGNOSIS — Z79.891 CHRONIC USE OF OPIATE FOR THERAPEUTIC PURPOSE: ICD-10-CM

## 2020-06-29 DIAGNOSIS — R20.0 BILATERAL HAND NUMBNESS: ICD-10-CM

## 2020-06-29 DIAGNOSIS — M54.41 CHRONIC MIDLINE LOW BACK PAIN WITH BILATERAL SCIATICA: Primary | ICD-10-CM

## 2020-06-29 PROCEDURE — 3008F PR BODY MASS INDEX (BMI) DOCUMENTED: ICD-10-PCS | Mod: CPTII,S$GLB,, | Performed by: PHYSICAL MEDICINE & REHABILITATION

## 2020-06-29 PROCEDURE — 3008F BODY MASS INDEX DOCD: CPT | Mod: CPTII,S$GLB,, | Performed by: PHYSICAL MEDICINE & REHABILITATION

## 2020-06-29 PROCEDURE — 99999 PR PBB SHADOW E&M-EST. PATIENT-LVL III: ICD-10-PCS | Mod: PBBFAC,,, | Performed by: PHYSICAL MEDICINE & REHABILITATION

## 2020-06-29 PROCEDURE — 99214 PR OFFICE/OUTPT VISIT, EST, LEVL IV, 30-39 MIN: ICD-10-PCS | Mod: S$GLB,,, | Performed by: PHYSICAL MEDICINE & REHABILITATION

## 2020-06-29 PROCEDURE — 99999 PR PBB SHADOW E&M-EST. PATIENT-LVL III: CPT | Mod: PBBFAC,,, | Performed by: PHYSICAL MEDICINE & REHABILITATION

## 2020-06-29 PROCEDURE — 99214 OFFICE O/P EST MOD 30 MIN: CPT | Mod: S$GLB,,, | Performed by: PHYSICAL MEDICINE & REHABILITATION

## 2020-06-29 PROCEDURE — 80307 DRUG TEST PRSMV CHEM ANLYZR: CPT

## 2020-06-29 RX ORDER — HYDROCODONE BITARTRATE AND ACETAMINOPHEN 7.5; 325 MG/1; MG/1
1 TABLET ORAL 3 TIMES DAILY PRN
Qty: 90 TABLET | Refills: 0 | Status: SHIPPED | OUTPATIENT
Start: 2020-07-03 | End: 2020-08-03 | Stop reason: SDUPTHER

## 2020-06-29 NOTE — PROGRESS NOTES
Subjective:       Patient ID: Roxanne Templeton is a 55 y.o. female.    Chief Complaint: No chief complaint on file.    HPI    HISTORY OF PRESENT ILLNESS:  Ms. Templeton is a 55-year-old white female who is followed up in the Physical Medicine Clinic for chronic low back pain with lumbar radiculopathy.  She is status post bilateral L5-S1 transforaminal Epidural Steroid Injection in 09/2019 significant relief.  Her last visit to the clinic was on 2/18/20.  She was maintained on diclofenac, venlafaxine, p.r.n. hydrocodone/APAP and p.r.n. tizanidine.    The patient is coning to the clinic for followup.  Her low back pain has been stable.  It is an intermittent aching pain in the lumbar spine and across her back.  She has occasional shooting pain to both ankles with throbbing sensations, worse on the left.  Her pain is worse with activity and prolonged standing or walking.  Her maximum pain 8/10 and minimum 1/10.  Today it is 1-2/10.  The patient has mild bilateral lower extremity weakness without change.  She denies any bowel or bladder incontinence. Her bilateral hand numbness has been better.      She is currently taking diclofenac 75 mg p.o. twice per day, venlafaxine  mg capsule, 2 capsules once per day.  She takes hydrocodone/APAP 7.5/325 p.r.n. three times per day (she sometimes takes 1 tab in the morning, half in the afternoon, 1 in the evening, and half when she wakes up at).  She takes tizanidine 4 mg p.r.n. 2-3 times per day.  She has been staying active at home and outside.      Past Medical History:   Diagnosis Date    Allergy     Chronic back pain     Chronic back pain     Hyperlipidemia          Review of Systems   Constitutional: Negative for fatigue.   Eyes: Negative for visual disturbance.   Respiratory: Negative for shortness of breath.    Cardiovascular: Negative for chest pain.   Gastrointestinal: Negative for blood in stool, constipation, nausea and vomiting.   Genitourinary: Negative for  difficulty urinating.   Musculoskeletal: Positive for back pain. Negative for arthralgias, gait problem and neck pain.   Neurological: Negative for dizziness and headaches.   Psychiatric/Behavioral: Negative for behavioral problems and sleep disturbance.       Objective:      Physical Exam  Vitals signs reviewed.   Constitutional:       General: She is not in acute distress.     Appearance: She is well-developed.   Neck:      Musculoskeletal: Normal range of motion.   Musculoskeletal:      Comments: BUE:  ROM:full.  Strength:    RUE: 5/5 at shoulder abduction, 5 elbow flexion, 5 elbow extension, 5 hand .   LUE: 5/5 at shoulder abduction, 5 elbow flexion, 5 elbow extension, 5 hand .  Sensation to pinprick:   RUE: intact.   LUE: intact.    BLE:  ROM:   RLE: full.   LLE: full.  Knee crepitus:   RLE: mild.   LLE: mild.   Strength:    RLE: 5/5 at hip flexion, 5 knee extension, 5 ankle DF, 5 PF.   LLE: 5/5 at hip flexion, 5 knee extension, 5 ankle DF, 5 PF.  Sensation to pinprick:     RLE: intact.      LLE: intact.   SLR (sitting):      RLE: -ve.      LLE: +ve.    Mild tenderness over lumbar spine.      Gait: WNL       Neurological:      Mental Status: She is alert.   Psychiatric:         Behavior: Behavior normal.               Assessment:       1. Chronic midline low back pain with bilateral sciatica    2. DDD (degenerative disc disease), lumbar    3. Bilateral hand numbness    4. Chronic use of opiate for therapeutic purpose        Plan:     - Continue diclofenac (VOLTAREN) 75 MG EC tablet; Take 1 tablet (75 mg total) by mouth 2 (two) times daily.  - Continue venlafaxine (EFFEXOR-XR) 150 MG capsule, 2 capsules once per day.  - Continu HYDROcodone-acetaminophen (NORCO) 7.5-325 mg per tablet; Take 1 tablet by mouth 3 (three) times daily as needed for Pain.  - Continue  tiZANidine (ZANAFLEX) 4 MG tablet; Take 1 tablet (4 mg total) by mouth 3 (three) times daily as needed.  - Regular home exercise program was  encouraged.  - Pain Clinic Drug Screen; Future  - Follow up in about 4 months (around 10/29/2020).     This was a 25 minute visit, more than 50% of which was spent counseling the patient about the diagnosis and the treatment plan.    This note was partly generated with "[employer] voice recognition software. I apologize for any possible typographical errors.

## 2020-07-04 LAB
6MAM UR QL: NOT DETECTED
7AMINOCLONAZEPAM UR QL: NOT DETECTED
A-OH ALPRAZ UR QL: NOT DETECTED
ALPRAZ UR QL: NOT DETECTED
AMPHET UR QL SCN: NOT DETECTED
ANNOTATION COMMENT IMP: NORMAL
ANNOTATION COMMENT IMP: NORMAL
BARBITURATES UR QL: NOT DETECTED
BUPRENORPHINE UR QL: NOT DETECTED
BZE UR QL: NOT DETECTED
CARBOXYTHC UR QL: NOT DETECTED
CARISOPRODOL UR QL: NOT DETECTED
CLONAZEPAM UR QL: NOT DETECTED
CODEINE UR QL: NOT DETECTED
CREAT UR-MCNC: 37 MG/DL (ref 20–400)
DIAZEPAM UR QL: NOT DETECTED
ETHYL GLUCURONIDE UR QL: NOT DETECTED
FENTANYL UR QL: NOT DETECTED
HYDROCODONE UR QL: PRESENT
HYDROMORPHONE UR QL: NOT DETECTED
LORAZEPAM UR QL: NOT DETECTED
MDA UR QL: NOT DETECTED
MDEA UR QL: NOT DETECTED
MDMA UR QL: NOT DETECTED
ME-PHENIDATE UR QL: NOT DETECTED
MEPERIDINE UR QL: NOT DETECTED
METHADONE UR QL: NOT DETECTED
METHAMPHET UR QL: NOT DETECTED
MIDAZOLAM UR QL SCN: NOT DETECTED
MORPHINE UR QL: NOT DETECTED
NORBUPRENORPHINE UR QL CFM: NOT DETECTED
NORDIAZEPAM UR QL: NOT DETECTED
NORFENTANYL UR QL: NOT DETECTED
NORHYDROCODONE UR QL CFM: PRESENT
NOROXYCODONE UR QL CFM: NOT DETECTED
NOROXYMORPHONE: NOT DETECTED
OXAZEPAM UR QL: NOT DETECTED
OXYCODONE UR QL: NOT DETECTED
OXYMORPHONE UR QL: NOT DETECTED
PATHOLOGY STUDY: NORMAL
PCP UR QL: NOT DETECTED
PHENTERMINE UR QL: NOT DETECTED
PROPOXYPH UR QL: NOT DETECTED
SERVICE CMNT-IMP: NORMAL
TAPENTADOL UR QL SCN: NOT DETECTED
TAPENTADOL-O-SULF: NOT DETECTED
TEMAZEPAM UR QL: NOT DETECTED
TRAMADOL UR QL: NOT DETECTED
ZOLPIDEM UR QL: NOT DETECTED

## 2020-07-15 ENCOUNTER — PATIENT OUTREACH (OUTPATIENT)
Dept: ADMINISTRATIVE | Facility: HOSPITAL | Age: 55
End: 2020-07-15

## 2020-07-16 ENCOUNTER — LAB VISIT (OUTPATIENT)
Dept: LAB | Facility: HOSPITAL | Age: 55
End: 2020-07-16
Attending: INTERNAL MEDICINE
Payer: COMMERCIAL

## 2020-07-16 DIAGNOSIS — Z00.00 HEALTH CARE MAINTENANCE: ICD-10-CM

## 2020-07-16 PROCEDURE — 82274 ASSAY TEST FOR BLOOD FECAL: CPT

## 2020-07-23 LAB — HEMOCCULT STL QL IA: NEGATIVE

## 2020-09-03 DIAGNOSIS — M54.42 CHRONIC MIDLINE LOW BACK PAIN WITH BILATERAL SCIATICA: ICD-10-CM

## 2020-09-03 DIAGNOSIS — G89.29 CHRONIC MIDLINE LOW BACK PAIN WITH BILATERAL SCIATICA: ICD-10-CM

## 2020-09-03 DIAGNOSIS — M54.41 CHRONIC MIDLINE LOW BACK PAIN WITH BILATERAL SCIATICA: ICD-10-CM

## 2020-09-04 RX ORDER — HYDROCODONE BITARTRATE AND ACETAMINOPHEN 7.5; 325 MG/1; MG/1
1 TABLET ORAL 3 TIMES DAILY PRN
Qty: 90 TABLET | Refills: 0 | Status: SHIPPED | OUTPATIENT
Start: 2020-09-04 | End: 2020-10-02 | Stop reason: SDUPTHER

## 2020-10-02 ENCOUNTER — PATIENT MESSAGE (OUTPATIENT)
Dept: PHYSICAL MEDICINE AND REHAB | Facility: CLINIC | Age: 55
End: 2020-10-02

## 2020-10-05 ENCOUNTER — PATIENT MESSAGE (OUTPATIENT)
Dept: ADMINISTRATIVE | Facility: HOSPITAL | Age: 55
End: 2020-10-05

## 2020-10-25 ENCOUNTER — PATIENT OUTREACH (OUTPATIENT)
Dept: ADMINISTRATIVE | Facility: OTHER | Age: 55
End: 2020-10-25

## 2020-10-26 ENCOUNTER — OFFICE VISIT (OUTPATIENT)
Dept: PHYSICAL MEDICINE AND REHAB | Facility: CLINIC | Age: 55
End: 2020-10-26
Payer: COMMERCIAL

## 2020-10-26 VITALS
WEIGHT: 143.31 LBS | HEART RATE: 82 BPM | SYSTOLIC BLOOD PRESSURE: 96 MMHG | BODY MASS INDEX: 21.22 KG/M2 | DIASTOLIC BLOOD PRESSURE: 68 MMHG | HEIGHT: 69 IN

## 2020-10-26 DIAGNOSIS — M54.42 CHRONIC MIDLINE LOW BACK PAIN WITH BILATERAL SCIATICA: Primary | ICD-10-CM

## 2020-10-26 DIAGNOSIS — M51.36 DDD (DEGENERATIVE DISC DISEASE), LUMBAR: ICD-10-CM

## 2020-10-26 DIAGNOSIS — G89.29 CHRONIC MIDLINE LOW BACK PAIN WITH BILATERAL SCIATICA: Primary | ICD-10-CM

## 2020-10-26 DIAGNOSIS — Z79.891 CHRONIC USE OF OPIATE FOR THERAPEUTIC PURPOSE: ICD-10-CM

## 2020-10-26 DIAGNOSIS — R20.0 BILATERAL HAND NUMBNESS: ICD-10-CM

## 2020-10-26 DIAGNOSIS — M54.41 CHRONIC MIDLINE LOW BACK PAIN WITH BILATERAL SCIATICA: Primary | ICD-10-CM

## 2020-10-26 PROCEDURE — 3008F BODY MASS INDEX DOCD: CPT | Mod: CPTII,S$GLB,, | Performed by: PHYSICAL MEDICINE & REHABILITATION

## 2020-10-26 PROCEDURE — 99999 PR PBB SHADOW E&M-EST. PATIENT-LVL III: ICD-10-PCS | Mod: PBBFAC,,, | Performed by: PHYSICAL MEDICINE & REHABILITATION

## 2020-10-26 PROCEDURE — 99213 OFFICE O/P EST LOW 20 MIN: CPT | Mod: S$GLB,,, | Performed by: PHYSICAL MEDICINE & REHABILITATION

## 2020-10-26 PROCEDURE — 99213 PR OFFICE/OUTPT VISIT, EST, LEVL III, 20-29 MIN: ICD-10-PCS | Mod: S$GLB,,, | Performed by: PHYSICAL MEDICINE & REHABILITATION

## 2020-10-26 PROCEDURE — 99999 PR PBB SHADOW E&M-EST. PATIENT-LVL III: CPT | Mod: PBBFAC,,, | Performed by: PHYSICAL MEDICINE & REHABILITATION

## 2020-10-26 PROCEDURE — 3008F PR BODY MASS INDEX (BMI) DOCUMENTED: ICD-10-PCS | Mod: CPTII,S$GLB,, | Performed by: PHYSICAL MEDICINE & REHABILITATION

## 2020-10-26 RX ORDER — HYDROCODONE BITARTRATE AND ACETAMINOPHEN 7.5; 325 MG/1; MG/1
1 TABLET ORAL 3 TIMES DAILY PRN
Qty: 90 TABLET | Refills: 0 | Status: SHIPPED | OUTPATIENT
Start: 2020-11-03 | End: 2020-12-02 | Stop reason: SDUPTHER

## 2020-10-26 RX ORDER — TRAZODONE HYDROCHLORIDE 50 MG/1
50 TABLET ORAL NIGHTLY
Qty: 30 TABLET | Refills: 3 | Status: SHIPPED | OUTPATIENT
Start: 2020-10-26 | End: 2021-01-18 | Stop reason: SDUPTHER

## 2020-10-26 RX ORDER — TIZANIDINE 4 MG/1
4 TABLET ORAL 3 TIMES DAILY PRN
Qty: 270 TABLET | Refills: 1 | Status: SHIPPED | OUTPATIENT
Start: 2020-10-26 | End: 2021-03-20 | Stop reason: SDUPTHER

## 2020-10-26 NOTE — PROGRESS NOTES
Subjective:       Patient ID: Roxanne Templeton is a 55 y.o. female.    Chief Complaint: No chief complaint on file.    HPI    HISTORY OF PRESENT ILLNESS:  Ms. Templeton is a 55-year-old white female who is followed up in the [facility] for chronic low back pain with lumbar radiculopathy. Her last visit to the clinic was on 6/29/2020.  She was maintained on diclofenac, venlafaxine, p.r.n. hydrocodone/APAP and p.r.n. tizanidine. A [facility] Drug Screen was obtained and was positive as expected for hydrocodone.    The patient is coning to the clinic for followup.  Her low back pain has been stable.  It is an intermittent aching pain in the lumbar spine and across her back.  She has occasional shooting pain to both ankles with throbbing sensations, worse on the left.  Her pain is worse with activity and prolonged standing or walking.  Her maximum pain 8/10 and minimum 2/10.  Today it is 2/10.  The patient has mild bilateral lower extremity weakness without change.  She denies any bowel or bladder incontinence. Her bilateral hand numbness has been nearly subsided.  She reports waking up to 3 times at night due to pain.    She is currently taking:  - diclofenac 75 mg p.o. twice per day  - venlafaxine  mg capsule, 2 capsules once per day  - hydrocodone/APAP 7.5/325 p.r.n. three times per day   - tizanidine 4 mg p.r.n. 2-3 times per day.    She has been staying active at home and outside.      Past Medical History:   Diagnosis Date    Allergy     Chronic back pain     Chronic back pain     Hyperlipidemia          Review of Systems   Constitutional: Negative for chills, fatigue and fever.   Eyes: Negative for visual disturbance.   Respiratory: Negative for shortness of breath.    Cardiovascular: Negative for chest pain.   Gastrointestinal: Negative for blood in stool, constipation, nausea and vomiting.   Genitourinary: Negative for difficulty urinating.   Musculoskeletal: Positive for back pain.  Negative for arthralgias, gait problem and neck pain.   Neurological: Positive for dizziness. Negative for headaches.   Psychiatric/Behavioral: Positive for sleep disturbance. Negative for behavioral problems.       Objective:      Physical Exam  Vitals signs reviewed.   Constitutional:       General: She is not in acute distress.     Appearance: She is well-developed.   Neck:      Musculoskeletal: Normal range of motion.   Musculoskeletal:      Comments: BUE:  ROM:full.  Strength:    RUE: 5/5 at shoulder abduction, 5 elbow flexion, 5 elbow extension, 5 hand .   LUE: 5/5 at shoulder abduction, 5 elbow flexion, 5 elbow extension, 5 hand .  Sensation to pinprick:   RUE: intact.   LUE: intact.    BLE:  ROM:   RLE: full.   LLE: full.  Knee crepitus:   RLE: mild.   LLE: mild.   Strength:    RLE: 5/5 at hip flexion, 5 knee extension, 5 ankle DF, 5 PF.   LLE: 5/5 at hip flexion, 5 knee extension, 5 ankle DF, 5 PF.  Sensation to pinprick:     RLE: intact.      LLE: intact.   SLR (sitting):      RLE: -ve.      LLE: +ve.    Mild tenderness over lumbar spine.      Gait: WNL       Neurological:      Mental Status: She is alert.   Psychiatric:         Behavior: Behavior normal.               Assessment:       1. Chronic midline low back pain with bilateral sciatica    2. DDD (degenerative disc disease), lumbar    3. Bilateral hand numbness    4. Chronic use of opiate for therapeutic purpose        Plan:     - Continue diclofenac (VOLTAREN) 75 MG EC tablet; Take 1 tablet (75 mg total) by mouth 2 (two) times daily.  - Continue venlafaxine (EFFEXOR-XR) 150 MG capsule, 2 capsules once per day.  - Continu HYDROcodone-acetaminophen (NORCO) 7.5-325 mg per tablet; Take 1 tablet by mouth 3 (three) times daily as needed for Pain.  - Continue  tiZANidine (ZANAFLEX) 4 MG tablet; Take 1 tablet (4 mg total) by mouth 3 (three) times daily as needed.  - Start traZODone (DESYREL) 50 MG tablet; Take 1 tablet (50 mg total) by mouth every  evening.  - Regular home exercise program was encouraged.  - Follow up in about 4 months (around 2/26/2021).       This note was partly generated with [employer] voice recognition software. I apologize for any possible typographical errors.

## 2020-11-01 ENCOUNTER — PATIENT MESSAGE (OUTPATIENT)
Dept: INTERNAL MEDICINE | Facility: CLINIC | Age: 55
End: 2020-11-01

## 2020-11-26 ENCOUNTER — PATIENT MESSAGE (OUTPATIENT)
Dept: PHYSICAL MEDICINE AND REHAB | Facility: CLINIC | Age: 55
End: 2020-11-26

## 2020-11-27 ENCOUNTER — PATIENT MESSAGE (OUTPATIENT)
Dept: INTERNAL MEDICINE | Facility: CLINIC | Age: 55
End: 2020-11-27

## 2020-11-27 ENCOUNTER — PATIENT MESSAGE (OUTPATIENT)
Dept: PHYSICAL MEDICINE AND REHAB | Facility: CLINIC | Age: 55
End: 2020-11-27

## 2020-12-03 ENCOUNTER — PATIENT MESSAGE (OUTPATIENT)
Dept: PHYSICAL MEDICINE AND REHAB | Facility: CLINIC | Age: 55
End: 2020-12-03

## 2020-12-03 DIAGNOSIS — M54.41 CHRONIC MIDLINE LOW BACK PAIN WITH BILATERAL SCIATICA: ICD-10-CM

## 2020-12-03 DIAGNOSIS — G89.29 CHRONIC MIDLINE LOW BACK PAIN WITH BILATERAL SCIATICA: ICD-10-CM

## 2020-12-03 DIAGNOSIS — M54.42 CHRONIC MIDLINE LOW BACK PAIN WITH BILATERAL SCIATICA: ICD-10-CM

## 2020-12-03 RX ORDER — HYDROCODONE BITARTRATE AND ACETAMINOPHEN 7.5; 325 MG/1; MG/1
1 TABLET ORAL 3 TIMES DAILY PRN
Qty: 90 TABLET | Refills: 0 | Status: CANCELLED | OUTPATIENT
Start: 2020-12-03 | End: 2021-01-02

## 2020-12-12 ENCOUNTER — PATIENT MESSAGE (OUTPATIENT)
Dept: INTERNAL MEDICINE | Facility: CLINIC | Age: 55
End: 2020-12-12

## 2021-01-03 ENCOUNTER — PATIENT MESSAGE (OUTPATIENT)
Dept: PHYSICAL MEDICINE AND REHAB | Facility: CLINIC | Age: 56
End: 2021-01-03

## 2021-01-04 ENCOUNTER — PATIENT MESSAGE (OUTPATIENT)
Dept: ADMINISTRATIVE | Facility: HOSPITAL | Age: 56
End: 2021-01-04

## 2021-01-04 DIAGNOSIS — M54.42 CHRONIC MIDLINE LOW BACK PAIN WITH BILATERAL SCIATICA: ICD-10-CM

## 2021-01-04 DIAGNOSIS — M54.41 CHRONIC MIDLINE LOW BACK PAIN WITH BILATERAL SCIATICA: ICD-10-CM

## 2021-01-04 DIAGNOSIS — G89.29 CHRONIC MIDLINE LOW BACK PAIN WITH BILATERAL SCIATICA: ICD-10-CM

## 2021-01-04 RX ORDER — HYDROCODONE BITARTRATE AND ACETAMINOPHEN 7.5; 325 MG/1; MG/1
1 TABLET ORAL 3 TIMES DAILY PRN
Qty: 90 TABLET | Refills: 0 | Status: SHIPPED | OUTPATIENT
Start: 2021-01-04 | End: 2021-02-04 | Stop reason: SDUPTHER

## 2021-01-14 ENCOUNTER — PATIENT MESSAGE (OUTPATIENT)
Dept: INTERNAL MEDICINE | Facility: CLINIC | Age: 56
End: 2021-01-14

## 2021-01-14 DIAGNOSIS — E78.2 MIXED HYPERLIPIDEMIA: ICD-10-CM

## 2021-01-14 RX ORDER — ATORVASTATIN CALCIUM 40 MG/1
40 TABLET, FILM COATED ORAL DAILY
Qty: 90 TABLET | Refills: 0 | Status: CANCELLED | OUTPATIENT
Start: 2021-01-14

## 2021-01-14 RX ORDER — ATORVASTATIN CALCIUM 40 MG/1
40 TABLET, FILM COATED ORAL DAILY
Qty: 90 TABLET | Refills: 0 | Status: SHIPPED | OUTPATIENT
Start: 2021-01-14 | End: 2021-01-29 | Stop reason: SDUPTHER

## 2021-01-15 ENCOUNTER — PATIENT MESSAGE (OUTPATIENT)
Dept: INTERNAL MEDICINE | Facility: CLINIC | Age: 56
End: 2021-01-15

## 2021-01-15 ENCOUNTER — TELEPHONE (OUTPATIENT)
Dept: PHYSICAL MEDICINE AND REHAB | Facility: CLINIC | Age: 56
End: 2021-01-15

## 2021-01-23 ENCOUNTER — PATIENT MESSAGE (OUTPATIENT)
Dept: PHYSICAL MEDICINE AND REHAB | Facility: CLINIC | Age: 56
End: 2021-01-23

## 2021-01-24 ENCOUNTER — PATIENT MESSAGE (OUTPATIENT)
Dept: PHYSICAL MEDICINE AND REHAB | Facility: CLINIC | Age: 56
End: 2021-01-24

## 2021-01-26 ENCOUNTER — PATIENT MESSAGE (OUTPATIENT)
Dept: PHYSICAL MEDICINE AND REHAB | Facility: CLINIC | Age: 56
End: 2021-01-26

## 2021-01-26 DIAGNOSIS — E78.2 MIXED HYPERLIPIDEMIA: ICD-10-CM

## 2021-01-27 RX ORDER — ATORVASTATIN CALCIUM 40 MG/1
TABLET, FILM COATED ORAL
Qty: 90 TABLET | Refills: 0 | OUTPATIENT
Start: 2021-01-27

## 2021-01-29 ENCOUNTER — TELEPHONE (OUTPATIENT)
Dept: INTERNAL MEDICINE | Facility: CLINIC | Age: 56
End: 2021-01-29

## 2021-01-29 DIAGNOSIS — E78.2 MIXED HYPERLIPIDEMIA: ICD-10-CM

## 2021-01-29 RX ORDER — ATORVASTATIN CALCIUM 40 MG/1
40 TABLET, FILM COATED ORAL DAILY
Qty: 90 TABLET | Refills: 3 | Status: SHIPPED | OUTPATIENT
Start: 2021-01-29 | End: 2021-03-20 | Stop reason: SDUPTHER

## 2021-02-04 ENCOUNTER — TELEPHONE (OUTPATIENT)
Dept: PHYSICAL MEDICINE AND REHAB | Facility: CLINIC | Age: 56
End: 2021-02-04

## 2021-02-04 ENCOUNTER — PATIENT MESSAGE (OUTPATIENT)
Dept: PHYSICAL MEDICINE AND REHAB | Facility: CLINIC | Age: 56
End: 2021-02-04

## 2021-02-04 DIAGNOSIS — M54.42 CHRONIC MIDLINE LOW BACK PAIN WITH BILATERAL SCIATICA: ICD-10-CM

## 2021-02-04 DIAGNOSIS — G89.29 CHRONIC MIDLINE LOW BACK PAIN WITH BILATERAL SCIATICA: ICD-10-CM

## 2021-02-04 DIAGNOSIS — M54.41 CHRONIC MIDLINE LOW BACK PAIN WITH BILATERAL SCIATICA: ICD-10-CM

## 2021-02-04 RX ORDER — HYDROCODONE BITARTRATE AND ACETAMINOPHEN 7.5; 325 MG/1; MG/1
1 TABLET ORAL 3 TIMES DAILY PRN
Qty: 90 TABLET | Refills: 0 | Status: SHIPPED | OUTPATIENT
Start: 2021-02-04 | End: 2021-03-05 | Stop reason: SDUPTHER

## 2021-02-05 ENCOUNTER — PATIENT MESSAGE (OUTPATIENT)
Dept: PHYSICAL MEDICINE AND REHAB | Facility: CLINIC | Age: 56
End: 2021-02-05

## 2021-03-05 ENCOUNTER — PATIENT MESSAGE (OUTPATIENT)
Dept: OPTOMETRY | Facility: CLINIC | Age: 56
End: 2021-03-05

## 2021-03-08 ENCOUNTER — PATIENT MESSAGE (OUTPATIENT)
Dept: PHYSICAL MEDICINE AND REHAB | Facility: CLINIC | Age: 56
End: 2021-03-08

## 2021-03-20 ENCOUNTER — PATIENT MESSAGE (OUTPATIENT)
Dept: PHYSICAL MEDICINE AND REHAB | Facility: CLINIC | Age: 56
End: 2021-03-20

## 2021-03-20 DIAGNOSIS — E78.2 MIXED HYPERLIPIDEMIA: ICD-10-CM

## 2021-03-22 ENCOUNTER — PATIENT MESSAGE (OUTPATIENT)
Dept: PHYSICAL MEDICINE AND REHAB | Facility: CLINIC | Age: 56
End: 2021-03-22

## 2021-03-22 DIAGNOSIS — M54.41 CHRONIC MIDLINE LOW BACK PAIN WITH BILATERAL SCIATICA: ICD-10-CM

## 2021-03-22 DIAGNOSIS — G89.29 CHRONIC MIDLINE LOW BACK PAIN WITH BILATERAL SCIATICA: ICD-10-CM

## 2021-03-22 DIAGNOSIS — M54.42 CHRONIC MIDLINE LOW BACK PAIN WITH BILATERAL SCIATICA: ICD-10-CM

## 2021-03-22 RX ORDER — TIZANIDINE 4 MG/1
4 TABLET ORAL 3 TIMES DAILY PRN
Qty: 270 TABLET | Refills: 0 | Status: SHIPPED | OUTPATIENT
Start: 2021-03-22 | End: 2021-04-23

## 2021-03-22 RX ORDER — TRAZODONE HYDROCHLORIDE 50 MG/1
50 TABLET ORAL NIGHTLY
Qty: 90 TABLET | Refills: 0 | Status: SHIPPED | OUTPATIENT
Start: 2021-03-22 | End: 2022-03-22

## 2021-03-22 RX ORDER — ATORVASTATIN CALCIUM 40 MG/1
40 TABLET, FILM COATED ORAL DAILY
Qty: 90 TABLET | Refills: 3 | Status: SHIPPED | OUTPATIENT
Start: 2021-03-22

## 2021-04-05 ENCOUNTER — PATIENT MESSAGE (OUTPATIENT)
Dept: ADMINISTRATIVE | Facility: HOSPITAL | Age: 56
End: 2021-04-05

## 2021-04-09 ENCOUNTER — PATIENT OUTREACH (OUTPATIENT)
Dept: ADMINISTRATIVE | Facility: HOSPITAL | Age: 56
End: 2021-04-09

## 2021-04-10 ENCOUNTER — PATIENT MESSAGE (OUTPATIENT)
Dept: PHYSICAL MEDICINE AND REHAB | Facility: CLINIC | Age: 56
End: 2021-04-10

## 2021-04-13 RX ORDER — HYDROCODONE BITARTRATE AND ACETAMINOPHEN 7.5; 325 MG/1; MG/1
1 TABLET ORAL 3 TIMES DAILY PRN
Qty: 90 TABLET | Refills: 0 | Status: SHIPPED | OUTPATIENT
Start: 2021-04-13 | End: 2021-05-13

## 2021-04-15 ENCOUNTER — PATIENT MESSAGE (OUTPATIENT)
Dept: RESEARCH | Facility: HOSPITAL | Age: 56
End: 2021-04-15

## 2022-05-10 DIAGNOSIS — E78.2 MIXED HYPERLIPIDEMIA: ICD-10-CM

## 2022-05-10 RX ORDER — ATORVASTATIN CALCIUM 40 MG/1
TABLET, FILM COATED ORAL
Qty: 90 TABLET | Refills: 0 | OUTPATIENT
Start: 2022-05-10

## 2022-05-10 NOTE — TELEPHONE ENCOUNTER
Refill Routing Note   Medication(s) are not appropriate for processing by [facility] for the following reason(s):      - Non-participating provider    [facility] action(s):  Route          Medication reconciliation completed: No     Appointments  past 12m or future 3m with PCP    Date Provider   Last Visit   9/5/2019 Mere Wheeler MD   Next Visit   Visit date not found Mere Wheeler MD   ED visits in past 90 days: 0        Note composed:8:44 AM 05/10/2022

## 2025-05-14 NOTE — TELEPHONE ENCOUNTER
Problem: Wound:  Goal: Will show signs of wound healing; wound closure and no evidence of infection  Description: Will show signs of wound healing; wound closure and no evidence of infection  Outcome: Progressing      ----- Message from Nadia Tucker sent at 3/20/2019 12:46 PM CDT -----  Contact: [phone]  Patient wants to know if it is necessary for to see the doctor on 3-.    Please advise, thank you

## (undated) DEVICE — DRESSING LEUKOPLAST FLEX 1X3IN